# Patient Record
Sex: FEMALE | Race: OTHER | HISPANIC OR LATINO
[De-identification: names, ages, dates, MRNs, and addresses within clinical notes are randomized per-mention and may not be internally consistent; named-entity substitution may affect disease eponyms.]

---

## 2023-03-07 ENCOUNTER — APPOINTMENT (OUTPATIENT)
Dept: ORTHOPEDIC SURGERY | Facility: CLINIC | Age: 88
End: 2023-03-07
Payer: MEDICARE

## 2023-03-07 VITALS — BODY MASS INDEX: 21.6 KG/M2 | WEIGHT: 110 LBS | HEIGHT: 60 IN

## 2023-03-07 DIAGNOSIS — I10 ESSENTIAL (PRIMARY) HYPERTENSION: ICD-10-CM

## 2023-03-07 PROBLEM — Z00.00 ENCOUNTER FOR PREVENTIVE HEALTH EXAMINATION: Status: ACTIVE | Noted: 2023-03-07

## 2023-03-07 PROCEDURE — 99205 OFFICE O/P NEW HI 60 MIN: CPT

## 2023-03-07 RX ORDER — OXYCODONE 5 MG/1
5 TABLET ORAL EVERY 8 HOURS
Qty: 10 | Refills: 0 | Status: ACTIVE | COMMUNITY
Start: 2023-03-07 | End: 1900-01-01

## 2023-03-08 NOTE — IMAGING
[de-identified] : Constitutional: well developed and well nourished, able to communicate\par Cardiovascular: Peripheral vascular exam is grossly normal\par Neurologic: Alert and oriented, no acute distress.\par Skin: normal skin with no ulcers, rashes, or lesions\par Pulmonary: No respiratory distress, breathing comfortably on room air\par Lymphatics: No obvious lymphadenopathy or lymphedema in areas examined\par \par LOWER EXTREMITY/ RIGHT HIP EXAM\par Standing pelvic alignment: Symmetric with no Trendelenburg\par Atrophy: none\par Ecchymosis/swelling: none\par \par Range of Motion\par Hip: Flexion/extension/ER/IR    deferred due to pain\par \par Neurovascular\par Distal extremities: warm to touch\par Sensation to light touch: intact\par Muscle strength: 5/5\par \par \par XR taken at [facility] on 03/02/23: R femoral neck fracture

## 2023-03-08 NOTE — HISTORY OF PRESENT ILLNESS
[Sudden] : sudden [10] : 10 [5] : 5 [Dull/Aching] : dull/aching [Constant] : constant [Retired] : Work status: retired [de-identified] : 03/07/2023 Ms. SOLA RINCON is a 89 years old  F who  presents today for evaluation of right hip. States was getting up from a chair and fell and landed on her knee and hip two weeks prior. Did not go to the ER. Had an XR showing a femoral neck fx. Ambulates with a cane prior to fall. \par PMH: COPD [] : no

## 2023-03-08 NOTE — ASSESSMENT
[FreeTextEntry1] : 89 y/o F with femoral neck fracture, 2 week prior\par \par -Recommended go to the ER for R hemiarthroplasty risks and benefits explained\par -Patient Non weightbearing until after surgery\par

## 2023-03-09 ENCOUNTER — INPATIENT (INPATIENT)
Facility: HOSPITAL | Age: 88
LOS: 4 days | Discharge: NURSING, REHAB OR LONG-TERM CARE | End: 2023-03-14
Attending: ORTHOPAEDIC SURGERY | Admitting: ORTHOPAEDIC SURGERY
Payer: MEDICARE

## 2023-03-09 VITALS
RESPIRATION RATE: 18 BRPM | TEMPERATURE: 98 F | WEIGHT: 110.01 LBS | DIASTOLIC BLOOD PRESSURE: 76 MMHG | OXYGEN SATURATION: 97 % | HEART RATE: 96 BPM | SYSTOLIC BLOOD PRESSURE: 153 MMHG | HEIGHT: 59 IN

## 2023-03-09 LAB
ABO RH CONFIRMATION: SIGNIFICANT CHANGE UP
ANION GAP SERPL CALC-SCNC: 11 MMOL/L — SIGNIFICANT CHANGE UP (ref 5–17)
ANION GAP SERPL CALC-SCNC: 8 MMOL/L — SIGNIFICANT CHANGE UP (ref 5–17)
APTT BLD: 31.8 SEC — SIGNIFICANT CHANGE UP (ref 27.5–35.5)
BLD GP AB SCN SERPL QL: SIGNIFICANT CHANGE UP
BUN SERPL-MCNC: 19 MG/DL — SIGNIFICANT CHANGE UP (ref 7–23)
BUN SERPL-MCNC: 26 MG/DL — HIGH (ref 7–23)
CALCIUM SERPL-MCNC: 8.7 MG/DL — SIGNIFICANT CHANGE UP (ref 8.5–10.1)
CALCIUM SERPL-MCNC: 9.5 MG/DL — SIGNIFICANT CHANGE UP (ref 8.5–10.1)
CHLORIDE SERPL-SCNC: 95 MMOL/L — LOW (ref 96–108)
CHLORIDE SERPL-SCNC: 97 MMOL/L — SIGNIFICANT CHANGE UP (ref 96–108)
CO2 SERPL-SCNC: 24 MMOL/L — SIGNIFICANT CHANGE UP (ref 22–31)
CO2 SERPL-SCNC: 27 MMOL/L — SIGNIFICANT CHANGE UP (ref 22–31)
CREAT SERPL-MCNC: 0.7 MG/DL — SIGNIFICANT CHANGE UP (ref 0.5–1.3)
CREAT SERPL-MCNC: 0.84 MG/DL — SIGNIFICANT CHANGE UP (ref 0.5–1.3)
EGFR: 64 ML/MIN/1.73M2 — SIGNIFICANT CHANGE UP
EGFR: 80 ML/MIN/1.73M2 — SIGNIFICANT CHANGE UP
FLUAV AG NPH QL: SIGNIFICANT CHANGE UP
FLUBV AG NPH QL: SIGNIFICANT CHANGE UP
GLUCOSE SERPL-MCNC: 108 MG/DL — HIGH (ref 70–99)
GLUCOSE SERPL-MCNC: 115 MG/DL — HIGH (ref 70–99)
HCT VFR BLD CALC: 41.8 % — SIGNIFICANT CHANGE UP (ref 34.5–45)
HGB BLD-MCNC: 14 G/DL — SIGNIFICANT CHANGE UP (ref 11.5–15.5)
INR BLD: 1.01 RATIO — SIGNIFICANT CHANGE UP (ref 0.88–1.16)
MCHC RBC-ENTMCNC: 30.5 PG — SIGNIFICANT CHANGE UP (ref 27–34)
MCHC RBC-ENTMCNC: 33.5 G/DL — SIGNIFICANT CHANGE UP (ref 32–36)
MCV RBC AUTO: 91.1 FL — SIGNIFICANT CHANGE UP (ref 80–100)
NRBC # BLD: 0 /100 WBCS — SIGNIFICANT CHANGE UP (ref 0–0)
PLATELET # BLD AUTO: 487 K/UL — HIGH (ref 150–400)
POTASSIUM SERPL-MCNC: 3.9 MMOL/L — SIGNIFICANT CHANGE UP (ref 3.5–5.3)
POTASSIUM SERPL-MCNC: 4.2 MMOL/L — SIGNIFICANT CHANGE UP (ref 3.5–5.3)
POTASSIUM SERPL-SCNC: 3.9 MMOL/L — SIGNIFICANT CHANGE UP (ref 3.5–5.3)
POTASSIUM SERPL-SCNC: 4.2 MMOL/L — SIGNIFICANT CHANGE UP (ref 3.5–5.3)
PROTHROM AB SERPL-ACNC: 12.1 SEC — SIGNIFICANT CHANGE UP (ref 10.5–13.4)
RBC # BLD: 4.59 M/UL — SIGNIFICANT CHANGE UP (ref 3.8–5.2)
RBC # FLD: 14.3 % — SIGNIFICANT CHANGE UP (ref 10.3–14.5)
SARS-COV-2 RNA SPEC QL NAA+PROBE: SIGNIFICANT CHANGE UP
SODIUM SERPL-SCNC: 130 MMOL/L — LOW (ref 135–145)
SODIUM SERPL-SCNC: 132 MMOL/L — LOW (ref 135–145)
WBC # BLD: 11.8 K/UL — HIGH (ref 3.8–10.5)
WBC # FLD AUTO: 11.8 K/UL — HIGH (ref 3.8–10.5)

## 2023-03-09 PROCEDURE — 93010 ELECTROCARDIOGRAM REPORT: CPT

## 2023-03-09 PROCEDURE — 71045 X-RAY EXAM CHEST 1 VIEW: CPT | Mod: 26

## 2023-03-09 PROCEDURE — 73552 X-RAY EXAM OF FEMUR 2/>: CPT | Mod: 26,RT

## 2023-03-09 PROCEDURE — 99285 EMERGENCY DEPT VISIT HI MDM: CPT

## 2023-03-09 PROCEDURE — 93970 EXTREMITY STUDY: CPT | Mod: 26

## 2023-03-09 PROCEDURE — 99223 1ST HOSP IP/OBS HIGH 75: CPT

## 2023-03-09 PROCEDURE — 73502 X-RAY EXAM HIP UNI 2-3 VIEWS: CPT | Mod: 26,RT

## 2023-03-09 RX ORDER — SODIUM CHLORIDE 9 MG/ML
1 INJECTION INTRAMUSCULAR; INTRAVENOUS; SUBCUTANEOUS ONCE
Refills: 0 | Status: COMPLETED | OUTPATIENT
Start: 2023-03-09 | End: 2023-03-10

## 2023-03-09 RX ORDER — SODIUM CHLORIDE 9 MG/ML
1000 INJECTION INTRAMUSCULAR; INTRAVENOUS; SUBCUTANEOUS
Refills: 0 | Status: DISCONTINUED | OUTPATIENT
Start: 2023-03-09 | End: 2023-03-10

## 2023-03-09 RX ORDER — METOPROLOL TARTRATE 50 MG
25 TABLET ORAL
Refills: 0 | Status: DISCONTINUED | OUTPATIENT
Start: 2023-03-09 | End: 2023-03-10

## 2023-03-09 RX ORDER — BUDESONIDE AND FORMOTEROL FUMARATE DIHYDRATE 160; 4.5 UG/1; UG/1
2 AEROSOL RESPIRATORY (INHALATION)
Qty: 0 | Refills: 0 | DISCHARGE

## 2023-03-09 RX ORDER — ACETAMINOPHEN 500 MG
650 TABLET ORAL EVERY 6 HOURS
Refills: 0 | Status: DISCONTINUED | OUTPATIENT
Start: 2023-03-09 | End: 2023-03-10

## 2023-03-09 RX ORDER — SODIUM CHLORIDE 9 MG/ML
500 INJECTION INTRAMUSCULAR; INTRAVENOUS; SUBCUTANEOUS ONCE
Refills: 0 | Status: COMPLETED | OUTPATIENT
Start: 2023-03-09 | End: 2023-03-09

## 2023-03-09 RX ORDER — BUDESONIDE AND FORMOTEROL FUMARATE DIHYDRATE 160; 4.5 UG/1; UG/1
2 AEROSOL RESPIRATORY (INHALATION)
Refills: 0 | Status: DISCONTINUED | OUTPATIENT
Start: 2023-03-09 | End: 2023-03-10

## 2023-03-09 RX ORDER — POLYETHYLENE GLYCOL 3350 17 G/17G
17 POWDER, FOR SOLUTION ORAL AT BEDTIME
Refills: 0 | Status: DISCONTINUED | OUTPATIENT
Start: 2023-03-09 | End: 2023-03-10

## 2023-03-09 RX ORDER — ONDANSETRON 8 MG/1
4 TABLET, FILM COATED ORAL EVERY 6 HOURS
Refills: 0 | Status: DISCONTINUED | OUTPATIENT
Start: 2023-03-09 | End: 2023-03-10

## 2023-03-09 RX ORDER — SODIUM CHLORIDE 9 MG/ML
1000 INJECTION, SOLUTION INTRAVENOUS
Refills: 0 | Status: DISCONTINUED | OUTPATIENT
Start: 2023-03-09 | End: 2023-03-09

## 2023-03-09 RX ORDER — CEFAZOLIN SODIUM 1 G
2000 VIAL (EA) INJECTION EVERY 8 HOURS
Refills: 0 | Status: COMPLETED | OUTPATIENT
Start: 2023-03-10 | End: 2023-03-10

## 2023-03-09 RX ORDER — METOPROLOL TARTRATE 50 MG
1 TABLET ORAL
Qty: 0 | Refills: 0 | DISCHARGE

## 2023-03-09 RX ORDER — MAGNESIUM HYDROXIDE 400 MG/1
30 TABLET, CHEWABLE ORAL DAILY
Refills: 0 | Status: DISCONTINUED | OUTPATIENT
Start: 2023-03-09 | End: 2023-03-10

## 2023-03-09 RX ORDER — SODIUM CHLORIDE 9 MG/ML
1 INJECTION INTRAMUSCULAR; INTRAVENOUS; SUBCUTANEOUS ONCE
Refills: 0 | Status: COMPLETED | OUTPATIENT
Start: 2023-03-09 | End: 2023-03-09

## 2023-03-09 RX ORDER — OXYCODONE HYDROCHLORIDE 5 MG/1
5 TABLET ORAL
Refills: 0 | Status: DISCONTINUED | OUTPATIENT
Start: 2023-03-09 | End: 2023-03-10

## 2023-03-09 RX ORDER — SODIUM CHLORIDE 9 MG/ML
1000 INJECTION INTRAMUSCULAR; INTRAVENOUS; SUBCUTANEOUS
Refills: 0 | Status: DISCONTINUED | OUTPATIENT
Start: 2023-03-09 | End: 2023-03-09

## 2023-03-09 RX ORDER — AMLODIPINE BESYLATE 2.5 MG/1
1 TABLET ORAL
Qty: 0 | Refills: 0 | DISCHARGE

## 2023-03-09 RX ORDER — HEPARIN SODIUM 5000 [USP'U]/ML
5000 INJECTION INTRAVENOUS; SUBCUTANEOUS EVERY 12 HOURS
Refills: 0 | Status: DISCONTINUED | OUTPATIENT
Start: 2023-03-09 | End: 2023-03-09

## 2023-03-09 RX ORDER — ACETAMINOPHEN 500 MG
750 TABLET ORAL ONCE
Refills: 0 | Status: COMPLETED | OUTPATIENT
Start: 2023-03-09 | End: 2023-03-09

## 2023-03-09 RX ORDER — OXYCODONE HYDROCHLORIDE 5 MG/1
2.5 TABLET ORAL
Refills: 0 | Status: DISCONTINUED | OUTPATIENT
Start: 2023-03-09 | End: 2023-03-10

## 2023-03-09 RX ORDER — HYDROMORPHONE HYDROCHLORIDE 2 MG/ML
0.5 INJECTION INTRAMUSCULAR; INTRAVENOUS; SUBCUTANEOUS ONCE
Refills: 0 | Status: DISCONTINUED | OUTPATIENT
Start: 2023-03-09 | End: 2023-03-10

## 2023-03-09 RX ORDER — SENNA PLUS 8.6 MG/1
2 TABLET ORAL AT BEDTIME
Refills: 0 | Status: DISCONTINUED | OUTPATIENT
Start: 2023-03-09 | End: 2023-03-10

## 2023-03-09 RX ORDER — TRAMADOL HYDROCHLORIDE 50 MG/1
25 TABLET ORAL EVERY 6 HOURS
Refills: 0 | Status: DISCONTINUED | OUTPATIENT
Start: 2023-03-09 | End: 2023-03-10

## 2023-03-09 RX ORDER — AMLODIPINE BESYLATE 2.5 MG/1
5 TABLET ORAL DAILY
Refills: 0 | Status: DISCONTINUED | OUTPATIENT
Start: 2023-03-09 | End: 2023-03-10

## 2023-03-09 RX ADMIN — Medication 650 MILLIGRAM(S): at 12:00

## 2023-03-09 RX ADMIN — OXYCODONE HYDROCHLORIDE 5 MILLIGRAM(S): 5 TABLET ORAL at 21:16

## 2023-03-09 RX ADMIN — OXYCODONE HYDROCHLORIDE 5 MILLIGRAM(S): 5 TABLET ORAL at 21:46

## 2023-03-09 RX ADMIN — Medication 300 MILLIGRAM(S): at 09:09

## 2023-03-09 RX ADMIN — Medication 1 TABLET(S): at 12:01

## 2023-03-09 RX ADMIN — Medication 650 MILLIGRAM(S): at 13:00

## 2023-03-09 RX ADMIN — Medication 750 MILLIGRAM(S): at 09:49

## 2023-03-09 RX ADMIN — SODIUM CHLORIDE 1 GRAM(S): 9 INJECTION INTRAMUSCULAR; INTRAVENOUS; SUBCUTANEOUS at 13:19

## 2023-03-09 RX ADMIN — AMLODIPINE BESYLATE 5 MILLIGRAM(S): 2.5 TABLET ORAL at 13:32

## 2023-03-09 RX ADMIN — SODIUM CHLORIDE 150 MILLILITER(S): 9 INJECTION INTRAMUSCULAR; INTRAVENOUS; SUBCUTANEOUS at 19:04

## 2023-03-09 RX ADMIN — Medication 25 MILLIGRAM(S): at 23:22

## 2023-03-09 RX ADMIN — Medication 650 MILLIGRAM(S): at 21:46

## 2023-03-09 RX ADMIN — Medication 650 MILLIGRAM(S): at 21:16

## 2023-03-09 RX ADMIN — HEPARIN SODIUM 5000 UNIT(S): 5000 INJECTION INTRAVENOUS; SUBCUTANEOUS at 09:10

## 2023-03-09 RX ADMIN — Medication 750 MILLIGRAM(S): at 09:19

## 2023-03-09 RX ADMIN — SODIUM CHLORIDE 75 MILLILITER(S): 9 INJECTION, SOLUTION INTRAVENOUS at 08:11

## 2023-03-09 RX ADMIN — ONDANSETRON 4 MILLIGRAM(S): 8 TABLET, FILM COATED ORAL at 13:19

## 2023-03-09 RX ADMIN — SODIUM CHLORIDE 500 MILLILITER(S): 9 INJECTION INTRAMUSCULAR; INTRAVENOUS; SUBCUTANEOUS at 13:18

## 2023-03-09 NOTE — ED PROVIDER NOTE - CLINICAL SUMMARY MEDICAL DECISION MAKING FREE TEXT BOX
sent to ed for ortho surgery sent to ed for ortho surgery. Pre op testing ordered by ortho. Hemodynamically stable. Pain well controlled. Admit.

## 2023-03-09 NOTE — CONSULT NOTE ADULT - ASSESSMENT
89 y/o female with pmh of HTN presents with right hip fx s/p mechanical fall in need of hemiarthroplasty.  History taken from patient and son and she denies any history of chf/CAD/angina/orthopnea/dyspnea on exertion. Can walk a distance of multiple blocks without stopping. vitals and PE benign. Denies any current cp, sob, cough or increased sputum. EKG unremarkable. Has mild hyponatremia but baseline labs are uknown. Will give a small bolus of normal saline and reeval BMP. Pt low to intermediate risk for planned procedure.     HTN   - c/w amlodipine and BB     hyponatremia   - trend     hip fracture  - care per ortho   - dvt ppx per surgery

## 2023-03-09 NOTE — PATIENT PROFILE ADULT - NSPROGENOTHERPROVIDER_GEN_A_NUR
none Complex Repair And Graft Additional Text (Will Appearing After The Standard Complex Repair Text): The complex repair was not sufficient to completely close the primary defect. The remaining additional defect was repaired with the graft mentioned below.

## 2023-03-09 NOTE — ED PROVIDER NOTE - MUSCULOSKELETAL MINIMAL EXAM
limited rom of R hip due to pain. dp/pt pulses intact. Able to range ankle and wiggle toes without issue

## 2023-03-09 NOTE — CONSULT NOTE ADULT - SUBJECTIVE AND OBJECTIVE BOX
HPI:      PAST MEDICAL & SURGICAL HISTORY:      REVIEW OF SYSTEMS:    CONSTITUTIONAL: No fever, weight loss, or fatigue  EYES: No eye pain, visual disturbances, or discharge  ENMT:  No difficulty hearing, tinnitus, vertigo; No sinus or throat pain  NECK: No pain or stiffness  RESPIRATORY: No cough, wheezing, chills or hemoptysis; No shortness of breath  CARDIOVASCULAR: No chest pain, palpitations, dizziness, or leg swelling  GASTROINTESTINAL: No abdominal or epigastric pain. No nausea, vomiting, or hematemesis; No diarrhea or constipation. No melena or hematochezia.  GENITOURINARY: No dysuria, frequency, hematuria, or incontinence  NEUROLOGICAL: No headaches, memory loss, loss of strength, numbness, or tremors  SKIN: No itching, burning, rashes, or lesions   LYMPH NODES: No enlarged glands  ENDOCRINE: No heat or cold intolerance; No hair loss        MEDICATIONS  (STANDING):  acetaminophen     Tablet .. 650 milliGRAM(s) Oral every 6 hours  amLODIPine   Tablet 5 milliGRAM(s) Oral daily  budesonide 160 MICROgram(s)/formoterol 4.5 MICROgram(s) Inhaler 2 Puff(s) Inhalation two times a day  heparin   Injectable 5000 Unit(s) SubCutaneous every 12 hours  HYDROmorphone  Injectable 0.5 milliGRAM(s) IV Push once  metoprolol tartrate 25 milliGRAM(s) Oral two times a day  multivitamin 1 Tablet(s) Oral daily  polyethylene glycol 3350 17 Gram(s) Oral at bedtime  senna 2 Tablet(s) Oral at bedtime    MEDICATIONS  (PRN):  magnesium hydroxide Suspension 30 milliLiter(s) Oral daily PRN Constipation  ondansetron Injectable 4 milliGRAM(s) IV Push every 6 hours PRN Nausea and/or Vomiting  oxyCODONE    IR 2.5 milliGRAM(s) Oral every 3 hours PRN Moderate Pain (4 - 6)  oxyCODONE    IR 5 milliGRAM(s) Oral every 3 hours PRN Severe Pain (7 - 10)  traMADol 25 milliGRAM(s) Oral every 6 hours PRN Mild Pain (1 - 3)      Allergies    No Known Allergies    Intolerances        SOCIAL HISTORY:    FAMILY HISTORY:      Vital Signs Last 24 Hrs  T(C): 36.8 (09 Mar 2023 11:27), Max: 36.8 (09 Mar 2023 11:27)  T(F): 98.3 (09 Mar 2023 11:27), Max: 98.3 (09 Mar 2023 11:27)  HR: 89 (09 Mar 2023 11:27) (89 - 96)  BP: 131/70 (09 Mar 2023 11:27) (131/70 - 153/76)  BP(mean): --  RR: 17 (09 Mar 2023 11:27) (17 - 18)  SpO2: 98% (09 Mar 2023 11:27) (97% - 98%)    Parameters below as of 09 Mar 2023 11:27  Patient On (Oxygen Delivery Method): room air        PHYSICAL EXAM:    GENERAL: NAD, well-groomed, well-developed  HEAD:  Atraumatic, Normocephalic  EYES: EOMI, PERRLA, conjunctiva and sclera clear  ENMT: No tonsillar erythema, exudates, or enlargement; Moist mucous membranes, Good dentition, No lesions  NECK: Supple, No JVD, Normal thyroid  NERVOUS SYSTEM:  Alert & Oriented X3, Good concentration; Motor Strength 5/5 B/L upper and lower extremities; DTRs 2+ intact and symmetric  CHEST/LUNG: Clear to percussion bilaterally; No rales, rhonchi, wheezing, or rubs  HEART: Regular rate and rhythm; No murmurs, rubs, or gallops  ABDOMEN: Soft, Nontender, Nondistended; Bowel sounds present  EXTREMITIES:  2+ Peripheral Pulses, No clubbing, cyanosis, or edema  LYMPH: No lymphadenopathy noted  SKIN: No rashes or lesions      LABS:                        14.0   11.80 )-----------( 487      ( 09 Mar 2023 07:54 )             41.8     03-09    130<L>  |  95<L>  |  26<H>  ----------------------------<  115<H>  4.2   |  27  |  0.84    Ca    9.5      09 Mar 2023 07:54      PT/INR - ( 09 Mar 2023 07:54 )   PT: 12.1 sec;   INR: 1.01 ratio         PTT - ( 09 Mar 2023 07:54 )  PTT:31.8 sec      RADIOLOGY & ADDITIONAL STUDIES:

## 2023-03-09 NOTE — ED ADULT NURSE NOTE - NSIMPLEMENTINTERV_GEN_ALL_ED
Implemented All Fall with Harm Risk Interventions:  [place] to call system. Call bell, personal items and telephone within reach. Instruct patient to call for assistance. Room bathroom lighting operational. Non-slip footwear when patient is off stretcher. Physically safe environment: no spills, clutter or unnecessary equipment. Stretcher in lowest position, wheels locked, appropriate side rails in place. Provide visual cue, wrist band, yellow gown, etc. Monitor gait and stability. Monitor for mental status changes and reorient to person, place, and time. Review medications for side effects contributing to fall risk. Reinforce activity limits and safety measures with patient and family. Provide visual clues: red socks.

## 2023-03-09 NOTE — ED ADULT NURSE REASSESSMENT NOTE - NS ED NURSE REASSESS COMMENT FT1
Covering RN:    Patient is resting comfortably in bed, in no acute distress.  Family at bedside.  Meds admin, see MAR.  No signs of acute distress at this time.  Will continue to monitor.

## 2023-03-09 NOTE — PATIENT PROFILE ADULT - FUNCTIONAL ASSESSMENT - BASIC MOBILITY 6.
3-calculated by average/Not able to assess (calculate score using [facility] averaging method)

## 2023-03-09 NOTE — ED PROVIDER NOTE - OBJECTIVE STATEMENT
89 yo F who presents to ED for surgical repair of known pelvic fx seen in xrays from outpt ortho appointment. Pt had fall at that time. No other injuries. Pre op labs and imaging ordered by ortho. Will admit. Pt took 1/2 dose of her codeine at home, requesting tylenol.

## 2023-03-09 NOTE — ED ADULT NURSE NOTE - OBJECTIVE STATEMENT
Patient sent by ortho for pre op workup for R hip fx s/p trip and fall last week.   R leg shortened, slightly externally rotated.

## 2023-03-09 NOTE — PATIENT PROFILE ADULT - FALL HARM RISK - HARM RISK INTERVENTIONS

## 2023-03-09 NOTE — H&P ADULT - ASSESSMENT
Pt is a 89yF PMH HTN, COPD (not on home O2) who presented to Dr. Olivo's clinic 3/7 after a mechanical fall 2/28 x2. Pt lives at home with her son, and reported falling getting out of the bed, -HS, -LOC, and falling a second time thereafter, onto her right side, -HS, -LOC. When the pt stood up after the second mechanical fall, she was in considerable right hip pain. Pt presented to Dr. Olivo's clinic 3/7 due to her hip pain, and was found to have a femoral neck fracture on xray. Pt was then sent in to the ER for further care. Pt ambulates with a cane, however has been nonambulatory since the mechanical fall. Pt denies fevers, chills, new onset numbness, weakness or tingling in the extremities.    T(C): 36.6 (03-09-23 @ 07:29), Max: 36.6 (03-09-23 @ 07:29)  T(F): 97.8 (03-09-23 @ 07:29), Max: 97.8 (03-09-23 @ 07:29)  HR: 96 (03-09-23 @ 07:29) (96 - 96)  BP: 153/76 (03-09-23 @ 07:29) (153/76 - 153/76)  RR: 18 (03-09-23 @ 07:29) (18 - 18)  SpO2: 97% (03-09-23 @ 07:29) (97% - 97%)  Skin: No erythema, edema or gross lesions noted.   Proximal hip TTP, otherwise NTTP  SILT L2-S1  Motor: +EHL/FHL/TA/GSc  Compartments soft and compressible  Calves NTTP b/l  +2 DP    Secondary Survey:   LLE/RUE/LUE: No TTP over bony prominences, SILT, palpable pulses, full/painless range of motion, compartments soft    Spine: No bony tenderness. No palpable stepoffs.                          14.0   11.80 )-----------( 487      ( 09 Mar 2023 07:54 )             41.8     03-09    130<L>  |  95<L>  |  26<H>  ----------------------------<  115<H>  4.2   |  27  |  0.84    Ca    9.5      09 Mar 2023 07:54    Imaging:  Radiographs of the R hip demonstrate a displaced femoral neck fracture    A/p:  Pt is a 89yF with a right femoral neck fracture going for a right hip hemiarthroplasty 3/10  NWB RLE/PT/OT  Pain regimen PRN  DVT ppx: hold heparin after midnight, SCDs only  Pre op abx ppx  Pre op labs  Medical clearance appreciated  NPO after midnight except medications  Dispo per PT  Plan discussed w patient, who is in agreement with the above  Plan discussed w attending, who is in agreement with the above

## 2023-03-09 NOTE — ED ADULT TRIAGE NOTE - CHIEF COMPLAINT QUOTE
fell on 28th of February c/o right hip bruising and inability to bear weight, sent by DR. David Olivo for right hip surgery tomorrow. HX HTN, COPD

## 2023-03-10 ENCOUNTER — TRANSCRIPTION ENCOUNTER (OUTPATIENT)
Age: 88
End: 2023-03-10

## 2023-03-10 LAB
ANION GAP SERPL CALC-SCNC: 9 MMOL/L — SIGNIFICANT CHANGE UP (ref 5–17)
ANION GAP SERPL CALC-SCNC: 9 MMOL/L — SIGNIFICANT CHANGE UP (ref 5–17)
APTT BLD: 28.2 SEC — SIGNIFICANT CHANGE UP (ref 27.5–35.5)
BUN SERPL-MCNC: 10 MG/DL — SIGNIFICANT CHANGE UP (ref 7–23)
BUN SERPL-MCNC: 12 MG/DL — SIGNIFICANT CHANGE UP (ref 7–23)
CALCIUM SERPL-MCNC: 8.5 MG/DL — SIGNIFICANT CHANGE UP (ref 8.5–10.1)
CALCIUM SERPL-MCNC: 9.4 MG/DL — SIGNIFICANT CHANGE UP (ref 8.5–10.1)
CHLORIDE SERPL-SCNC: 100 MMOL/L — SIGNIFICANT CHANGE UP (ref 96–108)
CHLORIDE SERPL-SCNC: 102 MMOL/L — SIGNIFICANT CHANGE UP (ref 96–108)
CO2 SERPL-SCNC: 23 MMOL/L — SIGNIFICANT CHANGE UP (ref 22–31)
CO2 SERPL-SCNC: 28 MMOL/L — SIGNIFICANT CHANGE UP (ref 22–31)
CREAT SERPL-MCNC: 0.72 MG/DL — SIGNIFICANT CHANGE UP (ref 0.5–1.3)
CREAT SERPL-MCNC: 0.8 MG/DL — SIGNIFICANT CHANGE UP (ref 0.5–1.3)
EGFR: 68 ML/MIN/1.73M2 — SIGNIFICANT CHANGE UP
EGFR: 77 ML/MIN/1.73M2 — SIGNIFICANT CHANGE UP
GLUCOSE SERPL-MCNC: 113 MG/DL — HIGH (ref 70–99)
GLUCOSE SERPL-MCNC: 97 MG/DL — SIGNIFICANT CHANGE UP (ref 70–99)
HCT VFR BLD CALC: 37.9 % — SIGNIFICANT CHANGE UP (ref 34.5–45)
HCT VFR BLD CALC: 40.6 % — SIGNIFICANT CHANGE UP (ref 34.5–45)
HGB BLD-MCNC: 12.4 G/DL — SIGNIFICANT CHANGE UP (ref 11.5–15.5)
HGB BLD-MCNC: 13.5 G/DL — SIGNIFICANT CHANGE UP (ref 11.5–15.5)
INR BLD: 1.02 RATIO — SIGNIFICANT CHANGE UP (ref 0.88–1.16)
MCHC RBC-ENTMCNC: 30.5 PG — SIGNIFICANT CHANGE UP (ref 27–34)
MCHC RBC-ENTMCNC: 30.6 PG — SIGNIFICANT CHANGE UP (ref 27–34)
MCHC RBC-ENTMCNC: 32.7 G/DL — SIGNIFICANT CHANGE UP (ref 32–36)
MCHC RBC-ENTMCNC: 33.3 G/DL — SIGNIFICANT CHANGE UP (ref 32–36)
MCV RBC AUTO: 92.1 FL — SIGNIFICANT CHANGE UP (ref 80–100)
MCV RBC AUTO: 93.1 FL — SIGNIFICANT CHANGE UP (ref 80–100)
NRBC # BLD: 0 /100 WBCS — SIGNIFICANT CHANGE UP (ref 0–0)
NRBC # BLD: 0 /100 WBCS — SIGNIFICANT CHANGE UP (ref 0–0)
PLATELET # BLD AUTO: 425 K/UL — HIGH (ref 150–400)
PLATELET # BLD AUTO: 453 K/UL — HIGH (ref 150–400)
POTASSIUM SERPL-MCNC: 3.4 MMOL/L — LOW (ref 3.5–5.3)
POTASSIUM SERPL-MCNC: 3.7 MMOL/L — SIGNIFICANT CHANGE UP (ref 3.5–5.3)
POTASSIUM SERPL-SCNC: 3.4 MMOL/L — LOW (ref 3.5–5.3)
POTASSIUM SERPL-SCNC: 3.7 MMOL/L — SIGNIFICANT CHANGE UP (ref 3.5–5.3)
PROTHROM AB SERPL-ACNC: 12.3 SEC — SIGNIFICANT CHANGE UP (ref 10.5–13.4)
RBC # BLD: 4.07 M/UL — SIGNIFICANT CHANGE UP (ref 3.8–5.2)
RBC # BLD: 4.41 M/UL — SIGNIFICANT CHANGE UP (ref 3.8–5.2)
RBC # FLD: 14.2 % — SIGNIFICANT CHANGE UP (ref 10.3–14.5)
RBC # FLD: 14.5 % — SIGNIFICANT CHANGE UP (ref 10.3–14.5)
SODIUM SERPL-SCNC: 134 MMOL/L — LOW (ref 135–145)
SODIUM SERPL-SCNC: 137 MMOL/L — SIGNIFICANT CHANGE UP (ref 135–145)
WBC # BLD: 17.12 K/UL — HIGH (ref 3.8–10.5)
WBC # BLD: 9.7 K/UL — SIGNIFICANT CHANGE UP (ref 3.8–10.5)
WBC # FLD AUTO: 17.12 K/UL — HIGH (ref 3.8–10.5)
WBC # FLD AUTO: 9.7 K/UL — SIGNIFICANT CHANGE UP (ref 3.8–10.5)

## 2023-03-10 PROCEDURE — 27236 TREAT THIGH FRACTURE: CPT | Mod: RT

## 2023-03-10 PROCEDURE — 99232 SBSQ HOSP IP/OBS MODERATE 35: CPT

## 2023-03-10 PROCEDURE — 27236 TREAT THIGH FRACTURE: CPT | Mod: AS,RT

## 2023-03-10 DEVICE — IMPLANTABLE DEVICE: Type: IMPLANTABLE DEVICE | Site: RIGHT HIP | Status: FUNCTIONAL

## 2023-03-10 DEVICE — CEMENT SIMPLEX P 40GM: Type: IMPLANTABLE DEVICE | Site: RIGHT HIP | Status: FUNCTIONAL

## 2023-03-10 RX ORDER — CEFAZOLIN SODIUM 1 G
2000 VIAL (EA) INJECTION EVERY 8 HOURS
Refills: 0 | Status: COMPLETED | OUTPATIENT
Start: 2023-03-10 | End: 2023-03-11

## 2023-03-10 RX ORDER — METOPROLOL TARTRATE 50 MG
25 TABLET ORAL
Refills: 0 | Status: DISCONTINUED | OUTPATIENT
Start: 2023-03-10 | End: 2023-03-14

## 2023-03-10 RX ORDER — BUDESONIDE AND FORMOTEROL FUMARATE DIHYDRATE 160; 4.5 UG/1; UG/1
2 AEROSOL RESPIRATORY (INHALATION)
Refills: 0 | Status: DISCONTINUED | OUTPATIENT
Start: 2023-03-10 | End: 2023-03-14

## 2023-03-10 RX ORDER — HYDRALAZINE HCL 50 MG
10 TABLET ORAL EVERY 8 HOURS
Refills: 0 | Status: DISCONTINUED | OUTPATIENT
Start: 2023-03-10 | End: 2023-03-10

## 2023-03-10 RX ORDER — FENTANYL CITRATE 50 UG/ML
50 INJECTION INTRAVENOUS
Refills: 0 | Status: DISCONTINUED | OUTPATIENT
Start: 2023-03-10 | End: 2023-03-10

## 2023-03-10 RX ORDER — OXYCODONE HYDROCHLORIDE 5 MG/1
5 TABLET ORAL
Refills: 0 | Status: DISCONTINUED | OUTPATIENT
Start: 2023-03-10 | End: 2023-03-14

## 2023-03-10 RX ORDER — OXYCODONE HYDROCHLORIDE 5 MG/1
2.5 TABLET ORAL
Refills: 0 | Status: DISCONTINUED | OUTPATIENT
Start: 2023-03-10 | End: 2023-03-14

## 2023-03-10 RX ORDER — ACETAMINOPHEN 500 MG
975 TABLET ORAL EVERY 8 HOURS
Refills: 0 | Status: DISCONTINUED | OUTPATIENT
Start: 2023-03-10 | End: 2023-03-14

## 2023-03-10 RX ORDER — MAGNESIUM HYDROXIDE 400 MG/1
30 TABLET, CHEWABLE ORAL DAILY
Refills: 0 | Status: DISCONTINUED | OUTPATIENT
Start: 2023-03-10 | End: 2023-03-14

## 2023-03-10 RX ORDER — ONDANSETRON 8 MG/1
4 TABLET, FILM COATED ORAL EVERY 6 HOURS
Refills: 0 | Status: DISCONTINUED | OUTPATIENT
Start: 2023-03-10 | End: 2023-03-14

## 2023-03-10 RX ORDER — POTASSIUM CHLORIDE 20 MEQ
20 PACKET (EA) ORAL
Refills: 0 | Status: COMPLETED | OUTPATIENT
Start: 2023-03-10 | End: 2023-03-11

## 2023-03-10 RX ORDER — SODIUM CHLORIDE 9 MG/ML
1000 INJECTION, SOLUTION INTRAVENOUS
Refills: 0 | Status: DISCONTINUED | OUTPATIENT
Start: 2023-03-10 | End: 2023-03-10

## 2023-03-10 RX ORDER — ENOXAPARIN SODIUM 100 MG/ML
40 INJECTION SUBCUTANEOUS EVERY 24 HOURS
Refills: 0 | Status: DISCONTINUED | OUTPATIENT
Start: 2023-03-11 | End: 2023-03-14

## 2023-03-10 RX ORDER — HYDROMORPHONE HYDROCHLORIDE 2 MG/ML
0.5 INJECTION INTRAMUSCULAR; INTRAVENOUS; SUBCUTANEOUS
Refills: 0 | Status: DISCONTINUED | OUTPATIENT
Start: 2023-03-10 | End: 2023-03-10

## 2023-03-10 RX ORDER — POLYETHYLENE GLYCOL 3350 17 G/17G
17 POWDER, FOR SOLUTION ORAL AT BEDTIME
Refills: 0 | Status: DISCONTINUED | OUTPATIENT
Start: 2023-03-10 | End: 2023-03-14

## 2023-03-10 RX ORDER — ONDANSETRON 8 MG/1
4 TABLET, FILM COATED ORAL ONCE
Refills: 0 | Status: DISCONTINUED | OUTPATIENT
Start: 2023-03-10 | End: 2023-03-10

## 2023-03-10 RX ORDER — ONDANSETRON 8 MG/1
4 TABLET, FILM COATED ORAL
Refills: 0 | Status: DISCONTINUED | OUTPATIENT
Start: 2023-03-10 | End: 2023-03-14

## 2023-03-10 RX ORDER — ACETAMINOPHEN 500 MG
1000 TABLET ORAL ONCE
Refills: 0 | Status: DISCONTINUED | OUTPATIENT
Start: 2023-03-10 | End: 2023-03-14

## 2023-03-10 RX ORDER — AMLODIPINE BESYLATE 2.5 MG/1
5 TABLET ORAL DAILY
Refills: 0 | Status: DISCONTINUED | OUTPATIENT
Start: 2023-03-10 | End: 2023-03-11

## 2023-03-10 RX ORDER — LANOLIN ALCOHOL/MO/W.PET/CERES
3 CREAM (GRAM) TOPICAL AT BEDTIME
Refills: 0 | Status: DISCONTINUED | OUTPATIENT
Start: 2023-03-10 | End: 2023-03-14

## 2023-03-10 RX ORDER — SENNA PLUS 8.6 MG/1
2 TABLET ORAL AT BEDTIME
Refills: 0 | Status: DISCONTINUED | OUTPATIENT
Start: 2023-03-10 | End: 2023-03-14

## 2023-03-10 RX ORDER — SODIUM CHLORIDE 9 MG/ML
1000 INJECTION INTRAMUSCULAR; INTRAVENOUS; SUBCUTANEOUS
Refills: 0 | Status: DISCONTINUED | OUTPATIENT
Start: 2023-03-10 | End: 2023-03-12

## 2023-03-10 RX ADMIN — AMLODIPINE BESYLATE 5 MILLIGRAM(S): 2.5 TABLET ORAL at 05:32

## 2023-03-10 RX ADMIN — Medication 25 MILLIGRAM(S): at 05:32

## 2023-03-10 RX ADMIN — Medication 650 MILLIGRAM(S): at 06:12

## 2023-03-10 RX ADMIN — Medication 650 MILLIGRAM(S): at 11:56

## 2023-03-10 RX ADMIN — Medication 100 MILLIGRAM(S): at 06:32

## 2023-03-10 RX ADMIN — SODIUM CHLORIDE 75 MILLILITER(S): 9 INJECTION INTRAMUSCULAR; INTRAVENOUS; SUBCUTANEOUS at 16:30

## 2023-03-10 RX ADMIN — Medication 100 MILLIGRAM(S): at 14:05

## 2023-03-10 RX ADMIN — SODIUM CHLORIDE 100 MILLILITER(S): 9 INJECTION, SOLUTION INTRAVENOUS at 22:13

## 2023-03-10 RX ADMIN — Medication 1 TABLET(S): at 11:41

## 2023-03-10 RX ADMIN — BUDESONIDE AND FORMOTEROL FUMARATE DIHYDRATE 2 PUFF(S): 160; 4.5 AEROSOL RESPIRATORY (INHALATION) at 06:13

## 2023-03-10 RX ADMIN — HYDROMORPHONE HYDROCHLORIDE 0.5 MILLIGRAM(S): 2 INJECTION INTRAMUSCULAR; INTRAVENOUS; SUBCUTANEOUS at 23:51

## 2023-03-10 RX ADMIN — OXYCODONE HYDROCHLORIDE 5 MILLIGRAM(S): 5 TABLET ORAL at 06:12

## 2023-03-10 RX ADMIN — OXYCODONE HYDROCHLORIDE 5 MILLIGRAM(S): 5 TABLET ORAL at 11:41

## 2023-03-10 RX ADMIN — Medication 650 MILLIGRAM(S): at 05:32

## 2023-03-10 RX ADMIN — HYDROMORPHONE HYDROCHLORIDE 0.5 MILLIGRAM(S): 2 INJECTION INTRAMUSCULAR; INTRAVENOUS; SUBCUTANEOUS at 22:51

## 2023-03-10 RX ADMIN — Medication 20 MILLIEQUIVALENT(S): at 22:49

## 2023-03-10 RX ADMIN — OXYCODONE HYDROCHLORIDE 5 MILLIGRAM(S): 5 TABLET ORAL at 05:33

## 2023-03-10 RX ADMIN — SODIUM CHLORIDE 1 GRAM(S): 9 INJECTION INTRAMUSCULAR; INTRAVENOUS; SUBCUTANEOUS at 05:37

## 2023-03-10 NOTE — DISCHARGE NOTE PROVIDER - CARE PROVIDER_API CALL
David Olivo)  [facility]  [facility]  [address] [place] [address], [address] [place] [address]  Phone: [phone]  Fax: [phone]  Follow Up Time:

## 2023-03-10 NOTE — PROGRESS NOTE ADULT - SUBJECTIVE AND OBJECTIVE BOX
Patient seen and examined at bedside. Pain is controlled. Pt feeling well. No nausea or vomiting.        Exam:  Gen: NAD, resting comfortably  RLE  Dressing c/d/i  +EHL/FHL/TA/GS  SILT dp/sp/saph/sural  +DP  Calf NTTP b/l  Compartments soft and compressible    A/P:  89yFemale Stable POD 0  s/p R yfn    -FU labs  -WBAT  -posterior hip precautions, abduction pillow when laying  -Pain control  -PT/OT  -Ppx ABX x24hrs  -DVT PE ppx- hold until POD1 then lovenox  -Incentive spirometry  -dispo per pt Patient seen and examined at bedside. Pain is controlled. Pt feeling well. No nausea or vomiting.        Exam:  Gen: NAD, resting comfortably  RLE  Dressing c/d/i  +EHL/FHL/TA/GS  SILT dp/sp/saph, subjective sural nerve diminished sensation  +DP  Calf NTTP b/l  Compartments soft and compressible    A/P:  89yFemale Stable POD 0  s/p R yfn    -FU labs  -WBAT  -posterior hip precautions, abduction pillow when laying  -Pain control  -PT/OT  -Ppx ABX x24hrs  -DVT PE ppx- hold until POD1 then lovenox  -Incentive spirometry  -dispo per pt

## 2023-03-10 NOTE — DISCHARGE NOTE PROVIDER - NSDCMRMEDTOKEN_GEN_ALL_CORE_FT
amLODIPine 5 mg oral tablet: 1 tab(s) orally once a day  Metoprolol Tartrate 25 mg oral tablet: 1 tab(s) orally 2 times a day  Symbicort 160 mcg-4.5 mcg/inh inhalation aerosol: 2 puff(s) inhaled 2 times a day, As Needed   amLODIPine 5 mg oral tablet: 1 tab(s) orally once a day  enoxaparin: 40 milligram(s) subcutaneous once a day  melatonin 3 mg oral tablet: 1 tab(s) orally once a day (at bedtime)  Metoprolol Tartrate 25 mg oral tablet: 1 tab(s) orally 2 times a day  ondansetron 4 mg oral tablet: 1 tab(s) orally 4 times a day  oxyCODONE 5 mg oral tablet: 1 tab(s) orally every 3 hours, As needed, Severe Pain (7 - 10)  Symbicort 160 mcg-4.5 mcg/inh inhalation aerosol: 2 puff(s) inhaled 2 times a day, As Needed

## 2023-03-10 NOTE — PROGRESS NOTE ADULT - ASSESSMENT
89 y/o female with pmh of HTN presents with right hip fx s/p mechanical fall in need of hemiarthroplasty.  History taken from patient and son and she denies any history of chf/CAD/angina/orthopnea/dyspnea on exertion. Can walk a distance of multiple blocks without stopping. vitals and PE benign. Denies any current cp, sob, cough or increased sputum. EKG unremarkable. Has mild hyponatremia but baseline labs are uknown. Will give a small bolus of normal saline and reeval BMP. Pt low to intermediate risk for planned procedure.     HTN. uncontrolled. Added home dose of hydralazine.  - c/w amlodipine and BB.  Monitor.     hyponatremia   -At risk for volume overload with current rate of IVF hydration. Discussed with ortho service and decreased the IVF rate. Sodium level improved already.     Right hip fracture. Traumatic from fall.  -cont current pain control.  DVT ppx and activity after hemiarthroplasty per ortho service.     Full code

## 2023-03-10 NOTE — PROGRESS NOTE ADULT - SUBJECTIVE AND OBJECTIVE BOX
CHIEF COMPLAINT: Follow up of HTN  left hip pain under control  no chest pain   no sob reported  no fever  no n/v/d/abd pain or leg edema      PHYSICAL EXAM:    GENERAL: Moderately built, no acute distress   CHEST/LUNG:  No wheezing, no crackles   HEART: Regular rate and rhythm; No murmurs  ABDOMEN: Soft, Nontender, Nondistended; Bowel sounds present  EXTREMITIES:  No clubbing, cyanosis, or edema. S/p right hip fracture   Psychiatry: AAO x 3, mood is appropriate       OBJECTIVE DATA:   Vital Signs Last 24 Hrs  T(C): 36.2 (10 Mar 2023 11:38), Max: 37.1 (09 Mar 2023 17:59)  T(F): 97.1 (10 Mar 2023 11:38), Max: 98.7 (09 Mar 2023 17:59)  HR: 86 (10 Mar 2023 11:38) (82 - 94)  BP: 158/72 (10 Mar 2023 11:38) (145/62 - 168/71)  BP(mean): --  RR: 18 (10 Mar 2023 11:38) (17 - 18)  SpO2: 95% (10 Mar 2023 11:38) (92% - 96%)    Parameters below as of 10 Mar 2023 11:38  Patient On (Oxygen Delivery Method): room air    LABS:                        13.5   9.70  )-----------( 453      ( 10 Mar 2023 06:10 )             40.6             03-10    137  |  100  |  12  ----------------------------<  97  3.7   |  28  |  0.80    Ca    9.4      10 Mar 2023 06:10                PT/INR - ( 10 Mar 2023 06:10 )   PT: 12.3 sec;   INR: 1.02 ratio         PTT - ( 10 Mar 2023 06:10 )  PTT:28.2 sec          Interval Radiology studies: reviewed by me  < from: Xray Femur 2 Views, Right (03.09.23 @ 09:10) >  IMPRESSION: Subcapital fracture right hip.There is some loss of volume   of the right upper lobe with a fine interstitial infiltrate. Acuity of   this process is uncertain.    < end of copied text >      MEDICATIONS  (STANDING):  acetaminophen     Tablet .. 650 milliGRAM(s) Oral every 6 hours  amLODIPine   Tablet 5 milliGRAM(s) Oral daily  budesonide 160 MICROgram(s)/formoterol 4.5 MICROgram(s) Inhaler 2 Puff(s) Inhalation two times a day  hydrALAZINE 10 milliGRAM(s) Oral every 8 hours  HYDROmorphone  Injectable 0.5 milliGRAM(s) IV Push once  metoprolol tartrate 25 milliGRAM(s) Oral two times a day  multivitamin 1 Tablet(s) Oral daily  polyethylene glycol 3350 17 Gram(s) Oral at bedtime  senna 2 Tablet(s) Oral at bedtime  sodium chloride 0.9%. 1000 milliLiter(s) (75 mL/Hr) IV Continuous <Continuous>    MEDICATIONS  (PRN):  magnesium hydroxide Suspension 30 milliLiter(s) Oral daily PRN Constipation  ondansetron Injectable 4 milliGRAM(s) IV Push every 6 hours PRN Nausea and/or Vomiting  oxyCODONE    IR 2.5 milliGRAM(s) Oral every 3 hours PRN Moderate Pain (4 - 6)  oxyCODONE    IR 5 milliGRAM(s) Oral every 3 hours PRN Severe Pain (7 - 10)  traMADol 25 milliGRAM(s) Oral every 6 hours PRN Mild Pain (1 - 3)

## 2023-03-10 NOTE — PROGRESS NOTE ADULT - ASSESSMENT
DOS: 3/10/22    ATTENDING SURGEON: David Olivo MD    ASSISTANT: IKER Silva    ANESTHESIA: General    PREOPERATIVE DIAGNOSIS: right femoral neck Fracture    POST OPERATIVE DIAGNOSIS:  right femoral neck Fracture    OPERATION: right hemiarthroplasty    INSTRUMENTATION USED:   Avenir cemented stem size 4, SO  size 28 head, 45mm bipolar head  +0 inner liner    INDICATION FOR THE PROCEDURE: The patient presented with a femoral neck fracture. She was a prior community ambulator with assistive device without any prior hip pain.The patient was thus indicated for the above mentioned procedure.    All risks and benefits of the procedure were explained to the patient. The patient fully understood the procedure and freely consented.    PROCEDURE IN DETAIL:  The patient was taken to the operating room and after anesthetic induction, was placed onto the surgical table in a lateral decubitus position. The bony prominences were well padded and a pegboard was used position the body. The skin and operative site were prepped and draped in the usual sterile fashion. A proper timeout was performed prior to the incision.    An incision was made over the posterolateral aspect of the femur both superior and inferior to the greater trochanter. The incision was deepened down to the fascia and IT band. The IT band was split parallel to the fibers and extended proximally along the fibers of the gluteus osito. A charnley retractor was inserted to retract the osito.    Next the external rotators and piriformis were identified. The piriformis was preserved and the ER/conjoint tendon was released along with the capsule and tagged. The hip was dislocated and the femoral neck fracture was cleaned up.  The head was measured and the proper size was trialed.    Attention was now turned to the femur. The femur was elevated and proper lateralization was performed. The femur was broached up to the planned size. Next trial reduction of the hip was performed and it was found to be stable at all physiologic ranges of motion.     The canal was cleaned and a cement restrictor placed. The canal was filled with cement and pressurized. The femoral stem was impacted in in place and the cement was allowed to harden. The head was impacted in place and once again leg lengths and offset were confirmed. All the arrays and checkpoints were removed at this time.     The wound was copiously irrigated and the deep fascia was closed with a barbed suture as was the subcutaneus layer and skin. A sterile occlusive dressing was placed. The patient was taken to the recovery room in stable condition. There were no complications during the procedure.  The assistance of a skilled physician assistant was required for the completion of the surgery.  DOS: 3/10/22    ATTENDING SURGEON: David Olivo MD    ASSISTANT: IKER Silva    ANESTHESIA: General    PREOPERATIVE DIAGNOSIS: right femoral neck Fracture    POST OPERATIVE DIAGNOSIS:  right femoral neck Fracture    OPERATION: right hemiarthroplasty    INSTRUMENTATION USED:   Avenir cemented stem size 1, SO  size 22 head, 43mm bipolar head  +3 inner liner    INDICATION FOR THE PROCEDURE: The patient presented with a femoral neck fracture. She was a prior community ambulator with assistive device without any prior hip pain.The patient was thus indicated for the above mentioned procedure.    All risks and benefits of the procedure were explained to the patient. The patient fully understood the procedure and freely consented.    PROCEDURE IN DETAIL:  The patient was taken to the operating room and after anesthetic induction, was placed onto the surgical table in a lateral decubitus position. The bony prominences were well padded and a pegboard was used position the body. The skin and operative site were prepped and draped in the usual sterile fashion. A proper timeout was performed prior to the incision.    An incision was made over the posterolateral aspect of the femur both superior and inferior to the greater trochanter. The incision was deepened down to the fascia and IT band. The IT band was split parallel to the fibers and extended proximally along the fibers of the gluteus osito. A charnley retractor was inserted to retract the osito.    Next the external rotators and piriformis were identified. The piriformis was preserved and the ER/conjoint tendon was released along with the capsule and tagged. The hip was dislocated and the femoral neck fracture was cleaned up.  The head was measured and the proper size was trialed.    Attention was now turned to the femur. The femur was elevated and proper lateralization was performed. The femur was broached up to the planned size. Next trial reduction of the hip was performed and it was found to be stable at all physiologic ranges of motion.     The canal was cleaned and a cement restrictor placed. The canal was filled with cement and pressurized. The femoral stem was impacted in in place and the cement was allowed to harden. The head was impacted in place and once again leg lengths and offset were confirmed. All the arrays and checkpoints were removed at this time.     The wound was copiously irrigated and the deep fascia was closed with a barbed suture as was the subcutaneus layer and skin. A sterile occlusive dressing was placed. The patient was taken to the recovery room in stable condition. There were no complications during the procedure.  The assistance of a skilled physician assistant was required for the completion of the surgery.

## 2023-03-10 NOTE — PROGRESS NOTE ADULT - SUBJECTIVE AND OBJECTIVE BOX
Patient seen and examined at bedside. Patient reports pain well controlled on medications. No acute events overnight. Pt denies fevers, chills, new onset numbness, weakness or tingling in the extremities.    T(C): 36.4 (03-10-23 @ 05:00), Max: 37.1 (03-09-23 @ 17:59)  T(F): 97.5 (03-10-23 @ 05:00), Max: 98.7 (03-09-23 @ 17:59)  HR: 94 (03-10-23 @ 05:00) (82 - 96)  BP: 152/74 (03-10-23 @ 05:00) (126/68 - 168/71)  RR: 18 (03-10-23 @ 05:00) (17 - 18)  SpO2: 96% (03-10-23 @ 05:00) (92% - 98%)    RLE  Skin: No erythema, edema or gross lesions noted.   Proximal hip TTP, otherwise NTTP  SILT L2-S1  Motor: +EHL/FHL/TA/GSc  Compartments soft and compressible  Calves NTTP b/l  +2 DP                          14.0   11.80 )-----------( 487      ( 09 Mar 2023 07:54 )             41.8   03-09    132<L>  |  97  |  19  ----------------------------<  108<H>  3.9   |  24  |  0.70    Ca    8.7      09 Mar 2023 16:15    A/p:  Pt is a 89yF with a right femoral neck fracture going for a right hip hemiarthroplasty 3/10  NWB RLE/PT/OT  Pain regimen PRN  DVT ppx: hold heparin after midnight, SCDs only  Pre op abx ppx  Pre op labs  Medical clearance appreciated  NPO except medications  Dispo per PT  Plan discussed w patient, who is in agreement with the above  Plan discussed w attending, who is in agreement with the above

## 2023-03-10 NOTE — DISCHARGE NOTE PROVIDER - NSDCFUADDINST_GEN_ALL_CORE_FT
Discharge Instructions for Total Hip Arthroplasty:    1. ACTIVITY: WBAT. Rolling walker. Posterior Hip Dislocation Precautions. Abduction Pillow while in bed for 6 weeks. Daily PT.  2. CALL FOR: fever over 101, wound redness, drainage or open area, calf pain/calf swelling.  3. BANDAGE: Change dressing to a new bandage POD7. May change sooner if dressing saturated or falling off. DO NOT REMOVE BANDAGE TO CHECK WOUND ON INTAKE.  4. STAPLES: RN Remove Staples POD14   5. SHOWER: Okay to shower. Do not scrub dressing or submerge under water. No baths or hot tubs.   6. DVT PE Prophylaxis:See Med Rec.  7.  GI: Continue Protonix daily while on Anticoagulant. eRx has been sent to your pharmacy.  8.  FOLLOW UP: Dr. Olivo. Call to schedule.  9. MEDICATION: eRX sent to your pharmacy for  if you go home.   10.**Call office if medications not covered under your insurance, especially BLOOD CLOT PREVENTION/anticoagulant medication.

## 2023-03-10 NOTE — BRIEF OPERATIVE NOTE - NSICDXBRIEFPROCEDURE_GEN_ALL_CORE_FT
PROCEDURES:  BETSY (total hip arthroplasty) 10-Mar-2023 20:44:14 Anjelica Mejia   PROCEDURES:  Hemiarthroplasty of right hip 10-Mar-2023 20:57:33  Anjelica Stoll

## 2023-03-10 NOTE — DISCHARGE NOTE PROVIDER - HOSPITAL COURSE
The patient is a 89y Female status post fall with right femoral neck fracture sent to [facility] for hemiarthroplasty. After appropriate medical optimization the patient was taken to the operating room on 3/10. Prophylactic antibiotics were started before the procedure and continued for 24 hours. There were no complications during the procedure and patient tolerated the procedure well. The patient was transferred to the recovery room in stable condition and subsequently to the surgical floor. The patient was placed on lovenox for anticoagulation while in house. All home medications were continued. The patient received physical therapy daily and daily labs were followed. The dressing was kept clean, dry, intact. The rest of the hospital stay was unremarkable.       **INCOMPLETE***** The patient is a 89y Female status post fall with right femoral neck fracture sent to [facility] for hemiarthroplasty. After appropriate medical optimization the patient was taken to the operating room on 3/10. Prophylactic antibiotics were started before the procedure and continued for 24 hours. There were no complications during the procedure and patient tolerated the procedure well. The patient was transferred to the recovery room in stable condition and subsequently to the surgical floor. The patient was placed on lovenox for anticoagulation while in house. All home medications were continued. The patient received physical therapy daily and daily labs were followed. The dressing was kept clean, dry, intact. The rest of the hospital stay was unremarkable.

## 2023-03-10 NOTE — PROGRESS NOTE ADULT - ASSESSMENT
I saw and examined the patient today. No changes from above documented exam.    I discussed this dx with the family at length. In order to restore the patient's ability to ambulate and minimize the complications associated with immobility, a Right hip hemiarthroplasty was recommended. The risks of the procedure were discussed at length which included but not limited to infection, bleeding, and damage to neurovascular structures. Informed consent was obtained.

## 2023-03-11 LAB
ANION GAP SERPL CALC-SCNC: 8 MMOL/L — SIGNIFICANT CHANGE UP (ref 5–17)
BUN SERPL-MCNC: 11 MG/DL — SIGNIFICANT CHANGE UP (ref 7–23)
CALCIUM SERPL-MCNC: 9.1 MG/DL — SIGNIFICANT CHANGE UP (ref 8.5–10.1)
CHLORIDE SERPL-SCNC: 99 MMOL/L — SIGNIFICANT CHANGE UP (ref 96–108)
CO2 SERPL-SCNC: 23 MMOL/L — SIGNIFICANT CHANGE UP (ref 22–31)
CREAT SERPL-MCNC: 0.67 MG/DL — SIGNIFICANT CHANGE UP (ref 0.5–1.3)
EGFR: 80 ML/MIN/1.73M2 — SIGNIFICANT CHANGE UP
GLUCOSE SERPL-MCNC: 125 MG/DL — HIGH (ref 70–99)
HCT VFR BLD CALC: 40.7 % — SIGNIFICANT CHANGE UP (ref 34.5–45)
HGB BLD-MCNC: 13.3 G/DL — SIGNIFICANT CHANGE UP (ref 11.5–15.5)
MCHC RBC-ENTMCNC: 30.2 PG — SIGNIFICANT CHANGE UP (ref 27–34)
MCHC RBC-ENTMCNC: 32.7 G/DL — SIGNIFICANT CHANGE UP (ref 32–36)
MCV RBC AUTO: 92.3 FL — SIGNIFICANT CHANGE UP (ref 80–100)
NRBC # BLD: 0 /100 WBCS — SIGNIFICANT CHANGE UP (ref 0–0)
PLATELET # BLD AUTO: 480 K/UL — HIGH (ref 150–400)
POTASSIUM SERPL-MCNC: 4.6 MMOL/L — SIGNIFICANT CHANGE UP (ref 3.5–5.3)
POTASSIUM SERPL-SCNC: 4.6 MMOL/L — SIGNIFICANT CHANGE UP (ref 3.5–5.3)
RBC # BLD: 4.41 M/UL — SIGNIFICANT CHANGE UP (ref 3.8–5.2)
RBC # FLD: 14.1 % — SIGNIFICANT CHANGE UP (ref 10.3–14.5)
SODIUM SERPL-SCNC: 130 MMOL/L — LOW (ref 135–145)
WBC # BLD: 12.2 K/UL — HIGH (ref 3.8–10.5)
WBC # FLD AUTO: 12.2 K/UL — HIGH (ref 3.8–10.5)

## 2023-03-11 PROCEDURE — 99232 SBSQ HOSP IP/OBS MODERATE 35: CPT

## 2023-03-11 PROCEDURE — 72170 X-RAY EXAM OF PELVIS: CPT | Mod: 26

## 2023-03-11 RX ORDER — AMLODIPINE BESYLATE 2.5 MG/1
10 TABLET ORAL DAILY
Refills: 0 | Status: DISCONTINUED | OUTPATIENT
Start: 2023-03-11 | End: 2023-03-14

## 2023-03-11 RX ADMIN — Medication 20 MILLIEQUIVALENT(S): at 00:11

## 2023-03-11 RX ADMIN — Medication 20 MILLIEQUIVALENT(S): at 01:25

## 2023-03-11 RX ADMIN — AMLODIPINE BESYLATE 5 MILLIGRAM(S): 2.5 TABLET ORAL at 05:43

## 2023-03-11 RX ADMIN — ONDANSETRON 4 MILLIGRAM(S): 8 TABLET, FILM COATED ORAL at 01:24

## 2023-03-11 RX ADMIN — SENNA PLUS 2 TABLET(S): 8.6 TABLET ORAL at 21:52

## 2023-03-11 RX ADMIN — Medication 100 MILLIGRAM(S): at 10:12

## 2023-03-11 RX ADMIN — POLYETHYLENE GLYCOL 3350 17 GRAM(S): 17 POWDER, FOR SOLUTION ORAL at 21:52

## 2023-03-11 RX ADMIN — Medication 975 MILLIGRAM(S): at 13:31

## 2023-03-11 RX ADMIN — Medication 975 MILLIGRAM(S): at 21:53

## 2023-03-11 RX ADMIN — SODIUM CHLORIDE 100 MILLILITER(S): 9 INJECTION INTRAMUSCULAR; INTRAVENOUS; SUBCUTANEOUS at 00:11

## 2023-03-11 RX ADMIN — Medication 25 MILLIGRAM(S): at 17:17

## 2023-03-11 RX ADMIN — Medication 975 MILLIGRAM(S): at 05:42

## 2023-03-11 RX ADMIN — ENOXAPARIN SODIUM 40 MILLIGRAM(S): 100 INJECTION SUBCUTANEOUS at 05:42

## 2023-03-11 RX ADMIN — ONDANSETRON 4 MILLIGRAM(S): 8 TABLET, FILM COATED ORAL at 05:43

## 2023-03-11 RX ADMIN — BUDESONIDE AND FORMOTEROL FUMARATE DIHYDRATE 2 PUFF(S): 160; 4.5 AEROSOL RESPIRATORY (INHALATION) at 17:18

## 2023-03-11 RX ADMIN — Medication 975 MILLIGRAM(S): at 21:52

## 2023-03-11 RX ADMIN — ONDANSETRON 4 MILLIGRAM(S): 8 TABLET, FILM COATED ORAL at 17:17

## 2023-03-11 RX ADMIN — Medication 975 MILLIGRAM(S): at 14:30

## 2023-03-11 RX ADMIN — ONDANSETRON 4 MILLIGRAM(S): 8 TABLET, FILM COATED ORAL at 11:46

## 2023-03-11 RX ADMIN — Medication 3 MILLIGRAM(S): at 21:52

## 2023-03-11 RX ADMIN — Medication 25 MILLIGRAM(S): at 05:42

## 2023-03-11 RX ADMIN — BUDESONIDE AND FORMOTEROL FUMARATE DIHYDRATE 2 PUFF(S): 160; 4.5 AEROSOL RESPIRATORY (INHALATION) at 05:42

## 2023-03-11 RX ADMIN — Medication 100 MILLIGRAM(S): at 02:17

## 2023-03-11 NOTE — OCCUPATIONAL THERAPY INITIAL EVALUATION ADULT - GENERAL OBSERVATIONS, REHAB EVAL
Pt encountered semisupine in bed, NAD, AXOX4, +heplock, +dressing R hip c/d/i, +abduction pillow intact, pt son present, PT darwin present, no c/o pain s/p right hemiarthroplasty.

## 2023-03-11 NOTE — OCCUPATIONAL THERAPY INITIAL EVALUATION ADULT - STRENGTHENING, PT EVAL
Pt will increase right lower extremity strength to 4/5 to improve functional strength needed to engage in functional tasks by 4 weeks

## 2023-03-11 NOTE — PROGRESS NOTE ADULT - SUBJECTIVE AND OBJECTIVE BOX
CHIEF COMPLAINT: Follow up of HTN  left hip pain under control POD#1  no chest pain   no sob reported  no fever  no n/v/d/abd pain or leg edema      PHYSICAL EXAM:    GENERAL: Moderately built, no acute distress   CHEST/LUNG:  No wheezing, no crackles   HEART: Regular rate and rhythm; No murmurs  ABDOMEN: Soft, Nontender, Nondistended; Bowel sounds present  EXTREMITIES:  No clubbing, cyanosis, or edema. S/p right hip hemiarthroplasty  Psychiatry: AAO x 2-3, mood is appropriate       OBJECTIVE DATA:     Vital Signs Last 24 Hrs  T(C): 36.4 (11 Mar 2023 09:15), Max: 36.4 (10 Mar 2023 10:36)  T(F): 97.5 (11 Mar 2023 09:15), Max: 97.5 (10 Mar 2023 10:36)  HR: 83 (11 Mar 2023 09:15) (68 - 110)  BP: 131/51 (11 Mar 2023 09:15) (122/56 - 182/85)  BP(mean): --  RR: 18 (11 Mar 2023 09:15) (12 - 18)  SpO2: 97% (11 Mar 2023 09:15) (92% - 99%)    Parameters below as of 11 Mar 2023 09:15  Patient On (Oxygen Delivery Method): room air               Daily     Daily   LABS:                        13.3   12.20 )-----------( 480      ( 11 Mar 2023 05:53 )             40.7             03-11    130<L>  |  99  |  11  ----------------------------<  125<H>  4.6   |  23  |  0.67    Ca    9.1      11 Mar 2023 05:53                PT/INR - ( 10 Mar 2023 06:10 )   PT: 12.3 sec;   INR: 1.02 ratio         PTT - ( 10 Mar 2023 06:10 )  PTT:28.2 sec         MEDICATIONS  (STANDING):  acetaminophen     Tablet .. 975 milliGRAM(s) Oral every 8 hours  acetaminophen   IVPB .. 1000 milliGRAM(s) IV Intermittent once  amLODIPine   Tablet 10 milliGRAM(s) Oral daily  budesonide 160 MICROgram(s)/formoterol 4.5 MICROgram(s) Inhaler 2 Puff(s) Inhalation two times a day  enoxaparin Injectable 40 milliGRAM(s) SubCutaneous every 24 hours  melatonin 3 milliGRAM(s) Oral at bedtime  metoprolol tartrate 25 milliGRAM(s) Oral two times a day  ondansetron    Tablet 4 milliGRAM(s) Oral four times a day  polyethylene glycol 3350 17 Gram(s) Oral at bedtime  senna 2 Tablet(s) Oral at bedtime  sodium chloride 0.9%. 1000 milliLiter(s) (100 mL/Hr) IV Continuous <Continuous>    MEDICATIONS  (PRN):  magnesium hydroxide Suspension 30 milliLiter(s) Oral daily PRN Constipation  ondansetron Injectable 4 milliGRAM(s) IV Push every 6 hours PRN Nausea and/or Vomiting  oxyCODONE    IR 2.5 milliGRAM(s) Oral every 3 hours PRN Moderate Pain (4 - 6)  oxyCODONE    IR 5 milliGRAM(s) Oral every 3 hours PRN Severe Pain (7 - 10)

## 2023-03-11 NOTE — PROGRESS NOTE ADULT - ASSESSMENT
89 y/o female with pmh of HTN presents with right hip fx s/p mechanical fall in need of hemiarthroplasty.  History taken from patient and son and she denies any history of chf/CAD/angina/orthopnea/dyspnea on exertion. Can walk a distance of multiple blocks without stopping. vitals and PE benign. Denies any current cp, sob, cough or increased sputum. EKG unremarkable. Has mild hyponatremia but baseline labs are uknown. Will give a small bolus of normal saline and reeval BMP. Pt low to intermediate risk for planned procedure.     HTN. controlled. cont home dose of hydralazine.  - c/w amlodipine and BB.  Monitor.     hyponatremia   -labile. discussed with nurse to dc IVF once patient able to tolerate oral diet.     Right hip fracture. Traumatic from fall.  -cont current pain control.  DVT ppx and activity after hemiarthroplasty per ortho service.     Full code   Medically no contraindications to discharge.

## 2023-03-11 NOTE — PHYSICAL THERAPY INITIAL EVALUATION ADULT - RANGE OF MOTION EXAMINATION, REHAB EVAL
R hip posterior dislocation: NO flexion beyond 90 degrees, no IR, no ADD pass midline/Left LE ROM was WFL (within functional limits)/deficits as listed below

## 2023-03-11 NOTE — PHYSICAL THERAPY INITIAL EVALUATION ADULT - ADDITIONAL COMMENTS
PH with 5-6 stairs from front and no steps from the back. inside there is a flight with almost 2 HR's to bedrooms. pt was independent with no AD and almost always her son, Chaz, assist her.

## 2023-03-11 NOTE — OCCUPATIONAL THERAPY INITIAL EVALUATION ADULT - RANGE OF MOTION EXAMINATION, LOWER EXTREMITY
Internal Medicine History and Physical    Admission Date: 1/10/2017                     Patient: Art Eckert             Date of Service:  17  : 1949                           67 year old male    Presenting Complaint:  Left foot ischemia     HPI:  The patient is a very pleasant 67 year old male. At baseline, he has a pmh significant for HTN, CAD, HLD, and CKD. He originally presented to [place] with left leg and foot pain and his left foot was found to be ischemic. Pt was placed on heparin drip and had stenting and balloon angioplasty of the left external iliac artery. He has been transferred to [facility] for the purpose of additional peripheral angiogram with Dr. Olmos for distal SFA occlusion. During my H&P, the patient interacts well and his pain is controlled.     Past Medical History:  Past Medical History   Diagnosis Date   • Bell palsy 2008     recurrent   • Blood clot associated with vein wall inflammation    • Bronchitis    • Coronary artery disease    • DJD (degenerative joint disease)    • Elevated prostate specific antigen (PSA) 10/28/2010     6.07   • Essential (primary) hypertension    • Heart disease      RCA stent   • High cholesterol    • HTN (hypertension)    • Malignant neoplasm of prostate 2011     pt2a No Mo gg3+4=7/10/SmNeg   • Polyp of colon    • Polyp of esophagus    • Urinary tract infection        Past Surgical History:  Past Surgical History   Procedure Laterality Date   • Biopsy of prostate,needle/punch  2010     GG 4+3=7   • Throat surgery procedure unlisted       Removal of polyps   • Tonsillectomy and adenoidectomy     • Cdl coronary stent  05     right coronary artery   • Lap prostatec retro rad/nerve  3/2/2011     pt2a N0 M0 gg3+4=7/10/ neg   • Paravertebral facet inj jnt lumbar sacral 1  12     2ND RIGHT LMBB 1 WEEK   • Paravertebral facet inj jnt lumbar sacral 1  10/15/12     2nd left LMBB 1 week    • Paravertebral facet inj jnt  lumbar sacral 1  10/30/12     LEFT LRFA 1 WEEK   • Destruction by neurolytic agt lumbar or sacral facet jt  1/21/2013     1st right LMBB   • Paravertebral facet inj jnt lumbar sacral 1  2/18/13     2nd RIGHT LMBB 1 WEEK   • Paravertebral facet inj jnt lumbar sacral 1  4/2/2013     Right LRFA   • Destruction by neurolytic agt lumbar or sacral facet jt  4/30/13     1st SLBB 1 week   • Cardiac catheterization         Family History:  Family History   Problem Relation Age of Onset   • Kidney disease Father    • Kidney disease Paternal Grandfather    • Cancer Brother        Social History:  Social History     Social History   • Marital status:      Spouse name: N/A   • Number of children: N/A   • Years of education: N/A     Occupational History   • Not on file.     Social History Main Topics   • Smoking status: Former Smoker     Packs/day: 1.00     Years: 21.00     Types: Cigars, Cigarettes     Quit date: 1/9/1995   • Smokeless tobacco: Not on file      Comment: Smokes cigars \"a couple times a week\" quit cigarettes.    • Alcohol use No      Comment: \"every now and then\"   • Drug use: No   • Sexual activity: Not on file     Other Topics Concern   • Not on file     Social History Narrative       Allergies:  ALLERGIES:   Allergen Reactions   • Contrast Media    • Aggrenox [Aspirin-Dipyridamole] SWELLING   • Heparin RASH     And swelling     • Plavix [Clopidogrel Bisulfate] SWELLING     Skin pealing   • Ticlid [Ticlopidine Hcl] SWELLING       Home Medications:  Patient to take supply of the following home medications:    Prescriptions Prior to Admission   Medication Sig Dispense Refill   • tadalafil (CIALIS) 20 MG tablet Take 1 tablet by mouth twice a week. (Patient taking differently: Take 20 mg by mouth as needed. Indications: Erectile Dysfunction ) 10 tablet 3   • aspirin 81 MG tablet Take 1 tablet by mouth daily.     • carvedilol (COREG) 6.25 MG tablet Take 6.25 mg by mouth 2 times daily (with meals).     •  lisinopril-hydrochlorothiazide (PRINZIDE,ZESTORETIC) 20-12.5 MG per tablet Take 1 tablet by mouth every 12 hours.     • pravastatin (PRAVACHOL) 20 MG tablet Take 20 mg by mouth nightly.      • potassium chloride (K-DUR) 10 MEQ tablet Take 10 mEq by mouth 2 times daily.     • amLODIPine (NORVASC) 10 MG tablet Take 10 mg by mouth daily.         Please update the MAR labels to indicate \"Patient to take home supply.\"      ROS:  Twelve points were reviewed  GENERAL: Denies any chills, fever.  RESPIRATORY: Complains of shortness of breath, denies hemoptysis.     CARDIOVASCULAR: Denies chest pain, palpitations.     GASTROINTESTINAL: Denies nausea, vomiting.     GENITOURINARY: Denies dysuria, hematuria.     MUSCULOSKELETAL: Trace edema.     ENDOCRINE: Denies heat or cold intolerance.     HEENT: Negative.     SKIN: Denies skin rash or itching.     IMMUNOLOGICAL: Denies allergies  PSYCHIATRIC: Denies any delusions, hallucinations.     CENTRAL NERVOUS SYSTEM: Denies any new stroke-like symptoms, vertigo or diplopia.      Physical Exam:  GENERAL: Alert and oriented x3, interacts very well, follows commands. Memory, mood and affect are at baseline.    Vitals:    01/11/17 0439   BP: 126/66   Pulse: 84   Resp: 16   Temp: 98.1 °F (36.7 °C)       SKIN: Warm and dry.  HEENT: Both pupils are equal and responding to light. Nose: Both nostrils patent.  LOWER EXTREMITY: Trace edema. Pedal pulses are very palpable.  Left fifth toe ischemia, left foot ischemia  NECK: Supple. Lymph nodes are not felt in the neck region.       LUNGS:  Lungs show coarse breath sounds bilaterally with moist crackles  CARDIOVASCULAR: S1, S2 with displaced PMI.  ABDOMEN: Soft, bowel sounds positive, nontender.  RECTAL AND PELVIC: Not performed.  CENTRAL NERVOUS SYSTEM: Cranial nerves are grossly intact 2-12. Motor strength both upper and lower extremities, positive 5/5. Reflexes, bulk, tone baseline. Sensory: Fine touch is intact. Gait is also at baseline.        Labs:    Recent Labs  Lab 01/11/17  0615   WBC 9.1   RBC 4.22*   HGB 12.5*   HCT 37.0*          Recent Labs  Lab 01/11/17  0615   SODIUM 139   POTASSIUM 4.3   CHLORIDE 104   CO2 27   BUN 20   CREATININE 1.53*   GLUCOSE 84   CALCIUM 8.3*           Impression:   I99.8 Left foot ischemia  I96 Dry Gangrene, left fifth toe  I73.9 Peripheral vascular disease, unspecified  N17.9 Acute kidney failure, unspecified  I10 Essential Hypertension  I25.10 Atherosclerotic heart disease of native coronary artery without angina pectoris  E66.9 Obesity, unspecified        Plan:  Admit as inpatient  Plan for angiogram in am   For angioplasty and stent .  Discussed with cardiology   Cardiology consult  Nephrology consult  Labs in AM  Amlodipine  Asa, statin, coreg  lovenox    Khai Dueñas MD  ____________________________________________________________  Nely Adames acting as a scribe for Dr. Dueñas    I have reviewed the information recorded by the scribe for accuracy and agree with its contents.    Dr. Spenser MD  Physician #:[id number]     right LE ROM limited 2/2 posterior hip precautions and s/p hemiarthroplasty/Left LE Active ROM was WFL (within functional limits)

## 2023-03-11 NOTE — PHYSICAL THERAPY INITIAL EVALUATION ADULT - GENERAL OBSERVATIONS, REHAB EVAL
Pt encountered semisupine in bed, NAD, AXOX4, +heplock, +dressing R hip c/d/i, +abduction pillow intact, pt son present, OT Jeffery, no c/o pain s/p right hemiarthroplasty., Son Chaz at bedside

## 2023-03-11 NOTE — OCCUPATIONAL THERAPY INITIAL EVALUATION ADULT - ADL RETRAINING, OT EVAL
Patient will be able to perform functional tasks with independence, using least restrictive device, within 4-6 weeks.

## 2023-03-11 NOTE — OCCUPATIONAL THERAPY INITIAL EVALUATION ADULT - TRANSFER TRAINING, PT EVAL
Patient will be able to perform functional transfers, using least restrictive device, c CGA within 2 weeks.

## 2023-03-11 NOTE — PHYSICAL THERAPY INITIAL EVALUATION ADULT - ACTIVE RANGE OF MOTION EXAMINATION, REHAB EVAL
R hip posterior dislocation: NO flexion beyond 90 degrees, no IR, no ADD pass midline/Left LE Active ROM was WFL (within functional limits)/deficits as listed below

## 2023-03-11 NOTE — OCCUPATIONAL THERAPY INITIAL EVALUATION ADULT - BED MOBILITY TRAINING, PT EVAL
Patient will be able to perform bed mobility tasks c CGA, using least restrictive device, within 2 weeks.

## 2023-03-11 NOTE — PROGRESS NOTE ADULT - SUBJECTIVE AND OBJECTIVE BOX
Patient seen and examined at bedside. Pain is controlled. Pt feeling well. No nausea or vomiting.    Vital Signs Last 24 Hrs  T(C): 36.4 (11 Mar 2023 05:14), Max: 36.4 (10 Mar 2023 10:36)  T(F): 97.5 (11 Mar 2023 05:14), Max: 97.5 (10 Mar 2023 10:36)  HR: 95 (11 Mar 2023 05:14) (68 - 110)  BP: 170/74 (11 Mar 2023 05:14) (122/56 - 182/85)  BP(mean): --  RR: 18 (11 Mar 2023 05:14) (12 - 18)  SpO2: 98% (11 Mar 2023 05:14) (92% - 99%)    Parameters below as of 11 Mar 2023 05:14  Patient On (Oxygen Delivery Method): room air        Exam:  Gen: NAD, resting comfortably  RLE  Dressing c/d/i  +EHL/FHL/TA/GS  SILT dp/sp/saph/sural  +DP  Calf NTTP b/l  Compartments soft and compressible    A/P:  89yFemale Stable POD 0  s/p R yfn    -FU labs  -WBAT  -posterior hip precautions, abduction pillow when laying  -Pain control  -PT/OT  -Ppx ABX x24hrs  -DVT PE ppx- hold until POD1 then lovenox  -Incentive spirometry  -dispo per pt Patient seen and examined at bedside. Pain is controlled. Pt feeling well. No nausea or vomiting.    Vital Signs Last 24 Hrs  T(C): 36.4 (11 Mar 2023 05:14), Max: 36.4 (10 Mar 2023 10:36)  T(F): 97.5 (11 Mar 2023 05:14), Max: 97.5 (10 Mar 2023 10:36)  HR: 95 (11 Mar 2023 05:14) (68 - 110)  BP: 170/74 (11 Mar 2023 05:14) (122/56 - 182/85)  BP(mean): --  RR: 18 (11 Mar 2023 05:14) (12 - 18)  SpO2: 98% (11 Mar 2023 05:14) (92% - 99%)    Parameters below as of 11 Mar 2023 05:14  Patient On (Oxygen Delivery Method): room air        Exam:  Gen: NAD, resting comfortably  RLE  Dressing c/d/i  +EHL/FHL/TA/GS  SILT dp/sp/saph, subjective sural nerve diminished sensation  +DP  Calf NTTP b/l  Compartments soft and compressible    A/P:  89yFemale Stable POD 0  s/p R yfn    -FU labs  -WBAT  -posterior hip precautions, abduction pillow when laying  -Pain control  -PT/OT  -Ppx ABX x24hrs  -DVT PE ppx- hold until POD1 then lovenox  -Incentive spirometry  -dispo per pt Patient seen and examined at bedside. Pain is controlled. Pt feeling well. No nausea or vomiting.    Vital Signs Last 24 Hrs  T(C): 36.4 (11 Mar 2023 05:14), Max: 36.4 (10 Mar 2023 10:36)  T(F): 97.5 (11 Mar 2023 05:14), Max: 97.5 (10 Mar 2023 10:36)  HR: 95 (11 Mar 2023 05:14) (68 - 110)  BP: 170/74 (11 Mar 2023 05:14) (122/56 - 182/85)  BP(mean): --  RR: 18 (11 Mar 2023 05:14) (12 - 18)  SpO2: 98% (11 Mar 2023 05:14) (92% - 99%)    Parameters below as of 11 Mar 2023 05:14  Patient On (Oxygen Delivery Method): room air        Exam:  Gen: NAD, resting comfortably  RLE  Dressing c/d/i  +EHL/FHL/TA/GS  SILT dp/sp/saph, subjective sural nerve diminished sensation  +DP  Calf NTTP b/l  Compartments soft and compressible    A/P:  89yFemale Stable POD1 s/p R yfn    -FU labs  -WBAT  -posterior hip precautions, abduction pillow when laying  -Pain control  -PT/OT  -Ppx ABX x24hrs  -DVT PE ppx- hold until POD1 then lovenox  -Incentive spirometry  -dispo per pt

## 2023-03-11 NOTE — OCCUPATIONAL THERAPY INITIAL EVALUATION ADULT - PERTINENT HX OF CURRENT PROBLEM, REHAB EVAL
Pt is 89 yo F AXOX4, hx of multiple falls, admitted to [facility] ED on 3/09/23 fell on 28th of February c/o right hip bruising and inability to bear weight, sent by DR. David Olivo for right hip surgery. Now POD0 Right hip hemiarthroplasty.

## 2023-03-12 LAB
ANION GAP SERPL CALC-SCNC: 5 MMOL/L — SIGNIFICANT CHANGE UP (ref 5–17)
APPEARANCE UR: CLEAR — SIGNIFICANT CHANGE UP
BACTERIA # UR AUTO: ABNORMAL
BILIRUB UR-MCNC: NEGATIVE — SIGNIFICANT CHANGE UP
BUN SERPL-MCNC: 15 MG/DL — SIGNIFICANT CHANGE UP (ref 7–23)
CALCIUM SERPL-MCNC: 9.2 MG/DL — SIGNIFICANT CHANGE UP (ref 8.5–10.1)
CHLORIDE SERPL-SCNC: 99 MMOL/L — SIGNIFICANT CHANGE UP (ref 96–108)
CO2 SERPL-SCNC: 22 MMOL/L — SIGNIFICANT CHANGE UP (ref 22–31)
COLOR SPEC: YELLOW — SIGNIFICANT CHANGE UP
CREAT SERPL-MCNC: 0.84 MG/DL — SIGNIFICANT CHANGE UP (ref 0.5–1.3)
DIFF PNL FLD: NEGATIVE — SIGNIFICANT CHANGE UP
EGFR: 64 ML/MIN/1.73M2 — SIGNIFICANT CHANGE UP
EPI CELLS # UR: SIGNIFICANT CHANGE UP
GLUCOSE SERPL-MCNC: 103 MG/DL — HIGH (ref 70–99)
GLUCOSE UR QL: NEGATIVE MG/DL — SIGNIFICANT CHANGE UP
HCT VFR BLD CALC: 32.2 % — LOW (ref 34.5–45)
HGB BLD-MCNC: 10.8 G/DL — LOW (ref 11.5–15.5)
KETONES UR-MCNC: ABNORMAL
LEUKOCYTE ESTERASE UR-ACNC: NEGATIVE — SIGNIFICANT CHANGE UP
MCHC RBC-ENTMCNC: 30.5 PG — SIGNIFICANT CHANGE UP (ref 27–34)
MCHC RBC-ENTMCNC: 33.5 G/DL — SIGNIFICANT CHANGE UP (ref 32–36)
MCV RBC AUTO: 91 FL — SIGNIFICANT CHANGE UP (ref 80–100)
NITRITE UR-MCNC: NEGATIVE — SIGNIFICANT CHANGE UP
NRBC # BLD: 0 /100 WBCS — SIGNIFICANT CHANGE UP (ref 0–0)
PH UR: 6 — SIGNIFICANT CHANGE UP (ref 5–8)
PLATELET # BLD AUTO: 417 K/UL — HIGH (ref 150–400)
POTASSIUM SERPL-MCNC: 4.1 MMOL/L — SIGNIFICANT CHANGE UP (ref 3.5–5.3)
POTASSIUM SERPL-SCNC: 4.1 MMOL/L — SIGNIFICANT CHANGE UP (ref 3.5–5.3)
PROT UR-MCNC: 15 MG/DL
RBC # BLD: 3.54 M/UL — LOW (ref 3.8–5.2)
RBC # FLD: 14.4 % — SIGNIFICANT CHANGE UP (ref 10.3–14.5)
RBC CASTS # UR COMP ASSIST: NEGATIVE /HPF — SIGNIFICANT CHANGE UP (ref 0–4)
SODIUM SERPL-SCNC: 126 MMOL/L — LOW (ref 135–145)
SP GR SPEC: 1.01 — SIGNIFICANT CHANGE UP (ref 1.01–1.02)
UROBILINOGEN FLD QL: NEGATIVE MG/DL — SIGNIFICANT CHANGE UP
WBC # BLD: 13.48 K/UL — HIGH (ref 3.8–10.5)
WBC # FLD AUTO: 13.48 K/UL — HIGH (ref 3.8–10.5)
WBC UR QL: SIGNIFICANT CHANGE UP

## 2023-03-12 PROCEDURE — 99232 SBSQ HOSP IP/OBS MODERATE 35: CPT

## 2023-03-12 RX ORDER — ONDANSETRON 8 MG/1
1 TABLET, FILM COATED ORAL
Qty: 0 | Refills: 0 | DISCHARGE
Start: 2023-03-12

## 2023-03-12 RX ORDER — ENOXAPARIN SODIUM 100 MG/ML
40 INJECTION SUBCUTANEOUS
Qty: 0 | Refills: 0 | DISCHARGE
Start: 2023-03-12

## 2023-03-12 RX ORDER — OXYCODONE HYDROCHLORIDE 5 MG/1
1 TABLET ORAL
Qty: 0 | Refills: 0 | DISCHARGE
Start: 2023-03-12

## 2023-03-12 RX ORDER — PANTOPRAZOLE SODIUM 20 MG/1
40 TABLET, DELAYED RELEASE ORAL
Refills: 0 | Status: DISCONTINUED | OUTPATIENT
Start: 2023-03-12 | End: 2023-03-14

## 2023-03-12 RX ORDER — LANOLIN ALCOHOL/MO/W.PET/CERES
1 CREAM (GRAM) TOPICAL
Qty: 0 | Refills: 0 | DISCHARGE
Start: 2023-03-12

## 2023-03-12 RX ORDER — ONDANSETRON 8 MG/1
4 TABLET, FILM COATED ORAL ONCE
Refills: 0 | Status: DISCONTINUED | OUTPATIENT
Start: 2023-03-12 | End: 2023-03-14

## 2023-03-12 RX ADMIN — AMLODIPINE BESYLATE 10 MILLIGRAM(S): 2.5 TABLET ORAL at 05:45

## 2023-03-12 RX ADMIN — Medication 3 MILLIGRAM(S): at 21:43

## 2023-03-12 RX ADMIN — Medication 975 MILLIGRAM(S): at 05:45

## 2023-03-12 RX ADMIN — ONDANSETRON 4 MILLIGRAM(S): 8 TABLET, FILM COATED ORAL at 18:28

## 2023-03-12 RX ADMIN — Medication 975 MILLIGRAM(S): at 13:16

## 2023-03-12 RX ADMIN — ONDANSETRON 4 MILLIGRAM(S): 8 TABLET, FILM COATED ORAL at 12:36

## 2023-03-12 RX ADMIN — Medication 975 MILLIGRAM(S): at 21:44

## 2023-03-12 RX ADMIN — PANTOPRAZOLE SODIUM 40 MILLIGRAM(S): 20 TABLET, DELAYED RELEASE ORAL at 12:35

## 2023-03-12 RX ADMIN — Medication 25 MILLIGRAM(S): at 12:40

## 2023-03-12 RX ADMIN — ONDANSETRON 4 MILLIGRAM(S): 8 TABLET, FILM COATED ORAL at 05:45

## 2023-03-12 RX ADMIN — Medication 25 MILLIGRAM(S): at 17:55

## 2023-03-12 RX ADMIN — OXYCODONE HYDROCHLORIDE 5 MILLIGRAM(S): 5 TABLET ORAL at 00:23

## 2023-03-12 RX ADMIN — ENOXAPARIN SODIUM 40 MILLIGRAM(S): 100 INJECTION SUBCUTANEOUS at 05:45

## 2023-03-12 RX ADMIN — OXYCODONE HYDROCHLORIDE 5 MILLIGRAM(S): 5 TABLET ORAL at 01:23

## 2023-03-12 RX ADMIN — POLYETHYLENE GLYCOL 3350 17 GRAM(S): 17 POWDER, FOR SOLUTION ORAL at 21:45

## 2023-03-12 RX ADMIN — Medication 975 MILLIGRAM(S): at 05:46

## 2023-03-12 RX ADMIN — Medication 975 MILLIGRAM(S): at 13:45

## 2023-03-12 RX ADMIN — BUDESONIDE AND FORMOTEROL FUMARATE DIHYDRATE 2 PUFF(S): 160; 4.5 AEROSOL RESPIRATORY (INHALATION) at 17:55

## 2023-03-12 RX ADMIN — BUDESONIDE AND FORMOTEROL FUMARATE DIHYDRATE 2 PUFF(S): 160; 4.5 AEROSOL RESPIRATORY (INHALATION) at 05:45

## 2023-03-12 RX ADMIN — SENNA PLUS 2 TABLET(S): 8.6 TABLET ORAL at 21:44

## 2023-03-12 NOTE — PROGRESS NOTE ADULT - SUBJECTIVE AND OBJECTIVE BOX
CHIEF COMPLAINT: Follow up of HTN  left hip pain under control POD#2  no chest pain   no sob reported  no fever  no n/v/d/abd pain or leg edema  Tolerating orals    PHYSICAL EXAM:    GENERAL: Moderately built, no acute distress   CHEST/LUNG:  No wheezing, no crackles   HEART: Regular rate and rhythm; No murmurs  ABDOMEN: Soft, Nontender, Nondistended; Bowel sounds present  EXTREMITIES:  No clubbing, cyanosis, or edema. S/p right hip hemiarthroplasty  Psychiatry: AAO x 2-3, mood is appropriate       OBJECTIVE DATA:       Vital Signs Last 24 Hrs  T(C): 36.3 (12 Mar 2023 05:23), Max: 36.6 (11 Mar 2023 12:40)  T(F): 97.3 (12 Mar 2023 05:23), Max: 97.8 (11 Mar 2023 12:40)  HR: 60 (12 Mar 2023 06:32) (56 - 88)  BP: 117/68 (12 Mar 2023 05:23) (117/68 - 168/69)  BP(mean): --  RR: 18 (12 Mar 2023 06:32) (17 - 18)  SpO2: 96% (12 Mar 2023 06:32) (94% - 100%)    Parameters below as of 12 Mar 2023 05:23  Patient On (Oxygen Delivery Method): room air               Daily     Daily   LABS:                        10.8   13.48 )-----------( 417      ( 12 Mar 2023 07:10 )             32.2             03-12    126<L>  |  99  |  15  ----------------------------<  103<H>  4.1   |  22  |  0.84    Ca    9.2      12 Mar 2023 07:10                       MEDICATIONS  (STANDING):  acetaminophen     Tablet .. 975 milliGRAM(s) Oral every 8 hours  acetaminophen   IVPB .. 1000 milliGRAM(s) IV Intermittent once  amLODIPine   Tablet 10 milliGRAM(s) Oral daily  budesonide 160 MICROgram(s)/formoterol 4.5 MICROgram(s) Inhaler 2 Puff(s) Inhalation two times a day  enoxaparin Injectable 40 milliGRAM(s) SubCutaneous every 24 hours  melatonin 3 milliGRAM(s) Oral at bedtime  metoprolol tartrate 25 milliGRAM(s) Oral two times a day  ondansetron    Tablet 4 milliGRAM(s) Oral four times a day  polyethylene glycol 3350 17 Gram(s) Oral at bedtime  senna 2 Tablet(s) Oral at bedtime    MEDICATIONS  (PRN):  magnesium hydroxide Suspension 30 milliLiter(s) Oral daily PRN Constipation  ondansetron Injectable 4 milliGRAM(s) IV Push every 6 hours PRN Nausea and/or Vomiting  oxyCODONE    IR 2.5 milliGRAM(s) Oral every 3 hours PRN Moderate Pain (4 - 6)  oxyCODONE    IR 5 milliGRAM(s) Oral every 3 hours PRN Severe Pain (7 - 10)         CHIEF COMPLAINT: Follow up of HTN  left hip pain under control POD#2  no chest pain   no sob reported  no fever  no n/v/d/abd pain or leg edema  Tolerating orals  + heart burns    PHYSICAL EXAM:    GENERAL: Moderately built, no acute distress   CHEST/LUNG:  No wheezing, no crackles   HEART: Regular rate and rhythm; No murmurs  ABDOMEN: Soft, Nontender, Nondistended; Bowel sounds present  EXTREMITIES:  No clubbing, cyanosis, or edema. S/p right hip hemiarthroplasty  Psychiatry: AAO x 2-3, mood is appropriate       OBJECTIVE DATA:       Vital Signs Last 24 Hrs  T(C): 36.3 (12 Mar 2023 05:23), Max: 36.6 (11 Mar 2023 12:40)  T(F): 97.3 (12 Mar 2023 05:23), Max: 97.8 (11 Mar 2023 12:40)  HR: 60 (12 Mar 2023 06:32) (56 - 88)  BP: 117/68 (12 Mar 2023 05:23) (117/68 - 168/69)  BP(mean): --  RR: 18 (12 Mar 2023 06:32) (17 - 18)  SpO2: 96% (12 Mar 2023 06:32) (94% - 100%)    Parameters below as of 12 Mar 2023 05:23  Patient On (Oxygen Delivery Method): room air               Daily     Daily   LABS:                        10.8   13.48 )-----------( 417      ( 12 Mar 2023 07:10 )             32.2             03-12    126<L>  |  99  |  15  ----------------------------<  103<H>  4.1   |  22  |  0.84    Ca    9.2      12 Mar 2023 07:10                       MEDICATIONS  (STANDING):  acetaminophen     Tablet .. 975 milliGRAM(s) Oral every 8 hours  acetaminophen   IVPB .. 1000 milliGRAM(s) IV Intermittent once  amLODIPine   Tablet 10 milliGRAM(s) Oral daily  budesonide 160 MICROgram(s)/formoterol 4.5 MICROgram(s) Inhaler 2 Puff(s) Inhalation two times a day  enoxaparin Injectable 40 milliGRAM(s) SubCutaneous every 24 hours  melatonin 3 milliGRAM(s) Oral at bedtime  metoprolol tartrate 25 milliGRAM(s) Oral two times a day  ondansetron    Tablet 4 milliGRAM(s) Oral four times a day  polyethylene glycol 3350 17 Gram(s) Oral at bedtime  senna 2 Tablet(s) Oral at bedtime    MEDICATIONS  (PRN):  magnesium hydroxide Suspension 30 milliLiter(s) Oral daily PRN Constipation  ondansetron Injectable 4 milliGRAM(s) IV Push every 6 hours PRN Nausea and/or Vomiting  oxyCODONE    IR 2.5 milliGRAM(s) Oral every 3 hours PRN Moderate Pain (4 - 6)  oxyCODONE    IR 5 milliGRAM(s) Oral every 3 hours PRN Severe Pain (7 - 10)

## 2023-03-12 NOTE — PROGRESS NOTE ADULT - ASSESSMENT
89 y/o female with pmh of HTN presents with right hip fx s/p mechanical fall in need of hemiarthroplasty.  History taken from patient and son and she denies any history of chf/CAD/angina/orthopnea/dyspnea on exertion. Can walk a distance of multiple blocks without stopping. vitals and PE benign. Denies any current cp, sob, cough or increased sputum. EKG unremarkable. Has mild hyponatremia but baseline labs are uknown. Will give a small bolus of normal saline and reeval BMP. Pt low to intermediate risk for planned procedure.     HTN. controlled. cont home dose of hydralazine.  - c/w amlodipine and BB.      hyponatremia   -asymptomatic. No need for IVF now.     Right hip fracture. Traumatic from fall.  -cont current pain control.  Lovenox for DVT ppx.   JOHNNY  per PT recs.     Full code   Medically no contraindications to discharge.  89 y/o female with pmh of HTN presents with right hip fx s/p mechanical fall in need of hemiarthroplasty.  History taken from patient and son and she denies any history of chf/CAD/angina/orthopnea/dyspnea on exertion. Can walk a distance of multiple blocks without stopping. vitals and PE benign. Denies any current cp, sob, cough or increased sputum. EKG unremarkable. Has mild hyponatremia but baseline labs are uknown. Will give a small bolus of normal saline and reeval BMP. Pt low to intermediate risk for planned procedure.     HTN. controlled. cont home dose of hydralazine.  - c/w amlodipine and BB.      hyponatremia   -asymptomatic. No need for IVF now.     Right hip fracture. Traumatic from fall.  -cont current pain control.  Lovenox for DVT ppx.   JOHNNY  per PT recs.     Give PPI for  heartburns and evaluate for improvement.     Full code   Medically no contraindications to discharge.

## 2023-03-13 LAB
ANION GAP SERPL CALC-SCNC: 4 MMOL/L — LOW (ref 5–17)
BUN SERPL-MCNC: 12 MG/DL — SIGNIFICANT CHANGE UP (ref 7–23)
CALCIUM SERPL-MCNC: 8.8 MG/DL — SIGNIFICANT CHANGE UP (ref 8.5–10.1)
CHLORIDE SERPL-SCNC: 100 MMOL/L — SIGNIFICANT CHANGE UP (ref 96–108)
CO2 SERPL-SCNC: 26 MMOL/L — SIGNIFICANT CHANGE UP (ref 22–31)
CREAT SERPL-MCNC: 0.72 MG/DL — SIGNIFICANT CHANGE UP (ref 0.5–1.3)
EGFR: 77 ML/MIN/1.73M2 — SIGNIFICANT CHANGE UP
GLUCOSE SERPL-MCNC: 95 MG/DL — SIGNIFICANT CHANGE UP (ref 70–99)
HCT VFR BLD CALC: 33.4 % — LOW (ref 34.5–45)
HGB BLD-MCNC: 11.3 G/DL — LOW (ref 11.5–15.5)
MCHC RBC-ENTMCNC: 30.5 PG — SIGNIFICANT CHANGE UP (ref 27–34)
MCHC RBC-ENTMCNC: 33.8 G/DL — SIGNIFICANT CHANGE UP (ref 32–36)
MCV RBC AUTO: 90 FL — SIGNIFICANT CHANGE UP (ref 80–100)
NRBC # BLD: 0 /100 WBCS — SIGNIFICANT CHANGE UP (ref 0–0)
PLATELET # BLD AUTO: 453 K/UL — HIGH (ref 150–400)
POTASSIUM SERPL-MCNC: 3.6 MMOL/L — SIGNIFICANT CHANGE UP (ref 3.5–5.3)
POTASSIUM SERPL-SCNC: 3.6 MMOL/L — SIGNIFICANT CHANGE UP (ref 3.5–5.3)
RBC # BLD: 3.71 M/UL — LOW (ref 3.8–5.2)
RBC # FLD: 14.6 % — HIGH (ref 10.3–14.5)
SODIUM SERPL-SCNC: 130 MMOL/L — LOW (ref 135–145)
WBC # BLD: 11.58 K/UL — HIGH (ref 3.8–10.5)
WBC # FLD AUTO: 11.58 K/UL — HIGH (ref 3.8–10.5)

## 2023-03-13 PROCEDURE — 99232 SBSQ HOSP IP/OBS MODERATE 35: CPT

## 2023-03-13 RX ORDER — ROPINIROLE 8 MG/1
0.25 TABLET, FILM COATED, EXTENDED RELEASE ORAL AT BEDTIME
Refills: 0 | Status: DISCONTINUED | OUTPATIENT
Start: 2023-03-13 | End: 2023-03-14

## 2023-03-13 RX ADMIN — Medication 975 MILLIGRAM(S): at 05:11

## 2023-03-13 RX ADMIN — POLYETHYLENE GLYCOL 3350 17 GRAM(S): 17 POWDER, FOR SOLUTION ORAL at 21:28

## 2023-03-13 RX ADMIN — ONDANSETRON 4 MILLIGRAM(S): 8 TABLET, FILM COATED ORAL at 19:08

## 2023-03-13 RX ADMIN — BUDESONIDE AND FORMOTEROL FUMARATE DIHYDRATE 2 PUFF(S): 160; 4.5 AEROSOL RESPIRATORY (INHALATION) at 19:07

## 2023-03-13 RX ADMIN — Medication 975 MILLIGRAM(S): at 22:00

## 2023-03-13 RX ADMIN — Medication 25 MILLIGRAM(S): at 19:07

## 2023-03-13 RX ADMIN — Medication 3 MILLIGRAM(S): at 21:27

## 2023-03-13 RX ADMIN — PANTOPRAZOLE SODIUM 40 MILLIGRAM(S): 20 TABLET, DELAYED RELEASE ORAL at 10:58

## 2023-03-13 RX ADMIN — AMLODIPINE BESYLATE 10 MILLIGRAM(S): 2.5 TABLET ORAL at 05:13

## 2023-03-13 RX ADMIN — Medication 975 MILLIGRAM(S): at 01:05

## 2023-03-13 RX ADMIN — Medication 975 MILLIGRAM(S): at 14:02

## 2023-03-13 RX ADMIN — ONDANSETRON 4 MILLIGRAM(S): 8 TABLET, FILM COATED ORAL at 05:12

## 2023-03-13 RX ADMIN — SENNA PLUS 2 TABLET(S): 8.6 TABLET ORAL at 21:27

## 2023-03-13 RX ADMIN — ENOXAPARIN SODIUM 40 MILLIGRAM(S): 100 INJECTION SUBCUTANEOUS at 05:12

## 2023-03-13 RX ADMIN — ONDANSETRON 4 MILLIGRAM(S): 8 TABLET, FILM COATED ORAL at 14:02

## 2023-03-13 RX ADMIN — Medication 25 MILLIGRAM(S): at 05:12

## 2023-03-13 RX ADMIN — BUDESONIDE AND FORMOTEROL FUMARATE DIHYDRATE 2 PUFF(S): 160; 4.5 AEROSOL RESPIRATORY (INHALATION) at 05:13

## 2023-03-13 RX ADMIN — Medication 975 MILLIGRAM(S): at 21:27

## 2023-03-13 RX ADMIN — ROPINIROLE 0.25 MILLIGRAM(S): 8 TABLET, FILM COATED, EXTENDED RELEASE ORAL at 21:26

## 2023-03-13 NOTE — PROGRESS NOTE ADULT - SUBJECTIVE AND OBJECTIVE BOX
CHIEF COMPLAINT: Follow up of HTN  left hip pain under control POD#3  no chest pain   no sob reported  no fever  no n/v/d/abd pain or leg edema  Tolerating orals  + heart burns better.     PHYSICAL EXAM:    GENERAL: Moderately built, no acute distress   CHEST/LUNG:  No wheezing, no crackles   HEART: Regular rate and rhythm; No murmurs  ABDOMEN: Soft, Nontender, Nondistended; Bowel sounds present  EXTREMITIES:  No clubbing, cyanosis, or edema. S/p right hip hemiarthroplasty  Psychiatry: AAO x 2-3, mood is appropriate       OBJECTIVE DATA:     Vital Signs Last 24 Hrs  T(C): 36.9 (13 Mar 2023 11:19), Max: 36.9 (13 Mar 2023 11:19)  T(F): 98.4 (13 Mar 2023 11:19), Max: 98.4 (13 Mar 2023 11:19)  HR: 74 (13 Mar 2023 11:42) (68 - 88)  BP: 147/67 (13 Mar 2023 11:42) (112/62 - 147/67)  BP(mean): --  RR: 18 (13 Mar 2023 11:42) (16 - 18)  SpO2: 97% (13 Mar 2023 11:42) (95% - 97%)    Parameters below as of 13 Mar 2023 11:42  Patient On (Oxygen Delivery Method): room air               Daily     Daily   LABS:                        11.3   11.58 )-----------( 453      ( 13 Mar 2023 06:52 )             33.4             03-13    130<L>  |  100  |  12  ----------------------------<  95  3.6   |  26  |  0.72    Ca    8.8      13 Mar 2023 06:52      MEDICATIONS  (STANDING):  acetaminophen     Tablet .. 975 milliGRAM(s) Oral every 8 hours  acetaminophen   IVPB .. 1000 milliGRAM(s) IV Intermittent once  amLODIPine   Tablet 10 milliGRAM(s) Oral daily  budesonide 160 MICROgram(s)/formoterol 4.5 MICROgram(s) Inhaler 2 Puff(s) Inhalation two times a day  enoxaparin Injectable 40 milliGRAM(s) SubCutaneous every 24 hours  melatonin 3 milliGRAM(s) Oral at bedtime  metoprolol tartrate 25 milliGRAM(s) Oral two times a day  ondansetron    Tablet 4 milliGRAM(s) Oral four times a day  ondansetron Injectable 4 milliGRAM(s) IV Push once  pantoprazole    Tablet 40 milliGRAM(s) Oral before breakfast  polyethylene glycol 3350 17 Gram(s) Oral at bedtime  rOPINIRole 0.25 milliGRAM(s) Oral at bedtime  senna 2 Tablet(s) Oral at bedtime    MEDICATIONS  (PRN):  magnesium hydroxide Suspension 30 milliLiter(s) Oral daily PRN Constipation  ondansetron Injectable 4 milliGRAM(s) IV Push every 6 hours PRN Nausea and/or Vomiting  oxyCODONE    IR 2.5 milliGRAM(s) Oral every 3 hours PRN Moderate Pain (4 - 6)  oxyCODONE    IR 5 milliGRAM(s) Oral every 3 hours PRN Severe Pain (7 - 10)

## 2023-03-13 NOTE — PROGRESS NOTE ADULT - SUBJECTIVE AND OBJECTIVE BOX
Patient seen and examined at bedside. Patient reports pain well controlled on medications. No acute events overnight. Denies numbness/tingling rle    Vital Signs Last 24 Hrs  T(C): 36.7 (12 Mar 2023 23:35), Max: 36.7 (12 Mar 2023 16:27)  T(F): 98.1 (12 Mar 2023 23:35), Max: 98.1 (12 Mar 2023 23:35)  HR: 68 (12 Mar 2023 23:35) (56 - 88)  BP: 123/70 (12 Mar 2023 23:35) (112/62 - 126/73)  BP(mean): --  RR: 18 (12 Mar 2023 23:35) (17 - 18)  SpO2: 96% (12 Mar 2023 23:35) (95% - 100%)    Parameters below as of 12 Mar 2023 23:35  Patient On (Oxygen Delivery Method): room air    Exam:  Gen: NAD, resting comfortably  RLE  Dressing c/d/i  +EHL/FHL/TA/GS  SILT dp/sp/saph, subjective sural nerve diminished sensation resolved  +DP  Calf NTTP b/l  Compartments soft and compressible              A/P:  89yFemale Stable POD3  s/p R yfn    -FU labs  -WBAT  -posterior hip precautions, abduction pillow when laying  -Pain control  -PT/OT  -DVT PE ppx- lovenox  -Incentive spirometry  -dispo: JOHNNY

## 2023-03-13 NOTE — PROGRESS NOTE ADULT - ASSESSMENT
89 y/o female with pmh of HTN presents with right hip fx s/p mechanical fall in need of hemiarthroplasty.  History taken from patient and son and she denies any history of chf/CAD/angina/orthopnea/dyspnea on exertion. Can walk a distance of multiple blocks without stopping. vitals and PE benign. Denies any current cp, sob, cough or increased sputum. EKG unremarkable. Has mild hyponatremia but baseline labs are uknown. Will give a small bolus of normal saline and reeval BMP. Pt low to intermediate risk for planned procedure.     HTN. controlled. cont home dose of hydralazine.  - c/w amlodipine and BB.    hyponatremia   -asymptomatic.     GERD. cont PPI.     Right hip fracture. Traumatic from fall.  -cont current pain control.  Lovenox for DVT ppx.   JOHNNY  per PT recs.     Restless leg syndrome. start requip at bedtime.       Full code   Medically no contraindications to discharge.

## 2023-03-14 ENCOUNTER — TRANSCRIPTION ENCOUNTER (OUTPATIENT)
Age: 88
End: 2023-03-14

## 2023-03-14 VITALS
TEMPERATURE: 98 F | SYSTOLIC BLOOD PRESSURE: 117 MMHG | DIASTOLIC BLOOD PRESSURE: 65 MMHG | RESPIRATION RATE: 18 BRPM | HEART RATE: 89 BPM | OXYGEN SATURATION: 96 %

## 2023-03-14 LAB
ANION GAP SERPL CALC-SCNC: 5 MMOL/L — SIGNIFICANT CHANGE UP (ref 5–17)
BUN SERPL-MCNC: 13 MG/DL — SIGNIFICANT CHANGE UP (ref 7–23)
CALCIUM SERPL-MCNC: 9.1 MG/DL — SIGNIFICANT CHANGE UP (ref 8.5–10.1)
CHLORIDE SERPL-SCNC: 98 MMOL/L — SIGNIFICANT CHANGE UP (ref 96–108)
CO2 SERPL-SCNC: 25 MMOL/L — SIGNIFICANT CHANGE UP (ref 22–31)
CREAT SERPL-MCNC: 0.75 MG/DL — SIGNIFICANT CHANGE UP (ref 0.5–1.3)
EGFR: 73 ML/MIN/1.73M2 — SIGNIFICANT CHANGE UP
FLUAV AG NPH QL: SIGNIFICANT CHANGE UP
FLUBV AG NPH QL: SIGNIFICANT CHANGE UP
GLUCOSE SERPL-MCNC: 98 MG/DL — SIGNIFICANT CHANGE UP (ref 70–99)
HCT VFR BLD CALC: 37.1 % — SIGNIFICANT CHANGE UP (ref 34.5–45)
HGB BLD-MCNC: 12.1 G/DL — SIGNIFICANT CHANGE UP (ref 11.5–15.5)
MCHC RBC-ENTMCNC: 29.8 PG — SIGNIFICANT CHANGE UP (ref 27–34)
MCHC RBC-ENTMCNC: 32.6 G/DL — SIGNIFICANT CHANGE UP (ref 32–36)
MCV RBC AUTO: 91.4 FL — SIGNIFICANT CHANGE UP (ref 80–100)
NRBC # BLD: 0 /100 WBCS — SIGNIFICANT CHANGE UP (ref 0–0)
PLATELET # BLD AUTO: 498 K/UL — HIGH (ref 150–400)
POTASSIUM SERPL-MCNC: 3.9 MMOL/L — SIGNIFICANT CHANGE UP (ref 3.5–5.3)
POTASSIUM SERPL-SCNC: 3.9 MMOL/L — SIGNIFICANT CHANGE UP (ref 3.5–5.3)
RBC # BLD: 4.06 M/UL — SIGNIFICANT CHANGE UP (ref 3.8–5.2)
RBC # FLD: 14.6 % — HIGH (ref 10.3–14.5)
SARS-COV-2 RNA SPEC QL NAA+PROBE: SIGNIFICANT CHANGE UP
SODIUM SERPL-SCNC: 128 MMOL/L — LOW (ref 135–145)
WBC # BLD: 11.37 K/UL — HIGH (ref 3.8–10.5)
WBC # FLD AUTO: 11.37 K/UL — HIGH (ref 3.8–10.5)

## 2023-03-14 PROCEDURE — 99232 SBSQ HOSP IP/OBS MODERATE 35: CPT

## 2023-03-14 RX ADMIN — Medication 975 MILLIGRAM(S): at 05:17

## 2023-03-14 RX ADMIN — PANTOPRAZOLE SODIUM 40 MILLIGRAM(S): 20 TABLET, DELAYED RELEASE ORAL at 05:17

## 2023-03-14 RX ADMIN — ONDANSETRON 4 MILLIGRAM(S): 8 TABLET, FILM COATED ORAL at 00:30

## 2023-03-14 RX ADMIN — Medication 25 MILLIGRAM(S): at 05:17

## 2023-03-14 RX ADMIN — ENOXAPARIN SODIUM 40 MILLIGRAM(S): 100 INJECTION SUBCUTANEOUS at 05:16

## 2023-03-14 RX ADMIN — Medication 975 MILLIGRAM(S): at 15:51

## 2023-03-14 RX ADMIN — ONDANSETRON 4 MILLIGRAM(S): 8 TABLET, FILM COATED ORAL at 05:17

## 2023-03-14 RX ADMIN — ONDANSETRON 4 MILLIGRAM(S): 8 TABLET, FILM COATED ORAL at 15:52

## 2023-03-14 RX ADMIN — Medication 975 MILLIGRAM(S): at 06:04

## 2023-03-14 RX ADMIN — BUDESONIDE AND FORMOTEROL FUMARATE DIHYDRATE 2 PUFF(S): 160; 4.5 AEROSOL RESPIRATORY (INHALATION) at 05:19

## 2023-03-14 RX ADMIN — AMLODIPINE BESYLATE 10 MILLIGRAM(S): 2.5 TABLET ORAL at 05:17

## 2023-03-14 NOTE — PROGRESS NOTE ADULT - SUBJECTIVE AND OBJECTIVE BOX
Patient seen and examined at bedside. Pain well controlled with medication. Patient denies any numbness, tingling, weakness, or any other orthopaedic complaint. Denies N/V/CP/SOB.       VITAL SIGNS:  T(C): 36.6 (03-14-23 @ 05:28), Max: 36.9 (03-13-23 @ 11:19)  HR: 64 (03-14-23 @ 05:28) (64 - 78)  BP: 157/63 (03-14-23 @ 05:28) (120/79 - 157/63)  RR: 18 (03-14-23 @ 05:28) (16 - 18)  SpO2: 98% (03-14-23 @ 05:28) (95% - 98%)      LABS:                        12.1   11.37 )-----------( 498      ( 14 Mar 2023 06:52 )             37.1     03-13    130<L>  |  100  |  12  ----------------------------<  95  3.6   |  26  |  0.72    Ca    8.8      13 Mar 2023 06:52                  Exam:  Gen: NAD, resting comfortably  RLE  Dressing c/d/i  +EHL/FHL/TA/GS  SILT dp/sp/saph, subjective sural nerve diminished sensation resolved  +DP  Calf NTTP b/l  Compartments soft and compressible              A/P:  89yFemale Stable POD4  s/p R yfn    -FU labs  -WBAT  -posterior hip precautions, abduction pillow when laying  -Pain control  -PT/OT  -DVT PE ppx- lovenox  -Incentive spirometry  -dispo: JOHNNY  - Orthostable for DC

## 2023-03-14 NOTE — DISCHARGE NOTE NURSING/CASE MANAGEMENT/SOCIAL WORK - NSDCPEFALRISK_GEN_ALL_CORE
For information on Fall & Injury Prevention, visit: [email or web address] OR  [email or web address] OR  https://www.cdc.gov/steadi/patient.html

## 2023-03-14 NOTE — PROGRESS NOTE ADULT - ASSESSMENT
89 y/o female with pmh of HTN presents with right hip fx s/p mechanical fall in need of hemiarthroplasty.  History taken from patient and son and she denies any history of chf/CAD/angina/orthopnea/dyspnea on exertion. Can walk a distance of multiple blocks without stopping. vitals and PE benign. Denies any current cp, sob, cough or increased sputum. EKG unremarkable. Has mild hyponatremia but baseline labs are uknown. Will give a small bolus of normal saline and reeval BMP. Pt low to intermediate risk for planned procedure.     HTN. controlled. cont home dose of hydralazine.  - c/w amlodipine and BB.    hyponatremia   -asymptomatic.     GERD. cont PPI.     Right hip fracture. Traumatic from fall.  -cont current pain control.  Lovenox for DVT ppx.   JOHNNY  per PT recs.     Restless leg syndrome. start requip at bedtime.       Full code   Medically no contraindications to discharge.

## 2023-03-14 NOTE — PROGRESS NOTE ADULT - PROVIDER SPECIALTY LIST ADULT
Hospitalist
Orthopedics
Hospitalist
Hospitalist
Orthopedics
Hospitalist
Hospitalist

## 2023-03-14 NOTE — PROGRESS NOTE ADULT - SUBJECTIVE AND OBJECTIVE BOX
Patient is a 89y old  Female who presents with a chief complaint of R femoral neck fracture (10 Mar 2023 20:46)    INTERVAL HPI/OVERNIGHT EVENTS:    MEDICATIONS  (STANDING):  acetaminophen     Tablet .. 975 milliGRAM(s) Oral every 8 hours  acetaminophen   IVPB .. 1000 milliGRAM(s) IV Intermittent once  amLODIPine   Tablet 10 milliGRAM(s) Oral daily  budesonide 160 MICROgram(s)/formoterol 4.5 MICROgram(s) Inhaler 2 Puff(s) Inhalation two times a day  enoxaparin Injectable 40 milliGRAM(s) SubCutaneous every 24 hours  melatonin 3 milliGRAM(s) Oral at bedtime  metoprolol tartrate 25 milliGRAM(s) Oral two times a day  ondansetron    Tablet 4 milliGRAM(s) Oral four times a day  ondansetron Injectable 4 milliGRAM(s) IV Push once  pantoprazole    Tablet 40 milliGRAM(s) Oral before breakfast  polyethylene glycol 3350 17 Gram(s) Oral at bedtime  rOPINIRole 0.25 milliGRAM(s) Oral at bedtime  senna 2 Tablet(s) Oral at bedtime    MEDICATIONS  (PRN):  magnesium hydroxide Suspension 30 milliLiter(s) Oral daily PRN Constipation  ondansetron Injectable 4 milliGRAM(s) IV Push every 6 hours PRN Nausea and/or Vomiting  oxyCODONE    IR 2.5 milliGRAM(s) Oral every 3 hours PRN Moderate Pain (4 - 6)  oxyCODONE    IR 5 milliGRAM(s) Oral every 3 hours PRN Severe Pain (7 - 10)    Allergies    No Known Allergies    Intolerances      REVIEW OF SYSTEMS:  All other systems reviewed and are negative    Vital Signs Last 24 Hrs  T(C): 36.6 (14 Mar 2023 05:28), Max: 36.9 (13 Mar 2023 23:53)  T(F): 97.8 (14 Mar 2023 05:28), Max: 98.4 (13 Mar 2023 23:53)  HR: 74 (14 Mar 2023 11:30) (64 - 78)  BP: 127/60 (14 Mar 2023 11:30) (120/79 - 157/63)  BP(mean): --  RR: 17 (14 Mar 2023 11:30) (17 - 18)  SpO2: 96% (14 Mar 2023 11:30) (95% - 98%)    Parameters below as of 14 Mar 2023 11:30  Patient On (Oxygen Delivery Method): room air      Daily     Daily   I&O's Summary    13 Mar 2023 07:01  -  14 Mar 2023 07:00  --------------------------------------------------------  IN: 0 mL / OUT: 950 mL / NET: -950 mL      CAPILLARY BLOOD GLUCOSE        PHYSICAL EXAM:  GENERAL: NAD,    HEAD:  Atraumatic, Normocephalic  EYES: EOMI, PERRLA, conjunctiva and sclera clear  ENMT: No tonsillar erythema, exudates, or enlargement; Moist mucous membranes, Good dentition, No lesions  NECK: Supple, No JVD, Normal thyroid  NERVOUS SYSTEM:  Alert & Oriented X3, Good concentration; Motor Strength 5/5 B/L upper and lower extremities; DTRs 2+ intact and symmetric  CHEST/LUNG: Clear to percussion bilaterally; No rales, rhonchi, wheezing, or rubs  HEART: Regular rate and rhythm; No murmurs, rubs, or gallops  ABDOMEN: Soft, Nontender, Nondistended; Bowel sounds present  EXTREMITIES:  2+ Peripheral Pulses, No clubbing, cyanosis, or edema  LYMPH: No lymphadenopathy noted  SKIN: No rashes or lesions    Labs                          12.1   11.37 )-----------( 498      ( 14 Mar 2023 06:52 )             37.1     03-14    128<L>  |  98  |  13  ----------------------------<  98  3.9   |  25  |  0.75    Ca    9.1      14 Mar 2023 06:52                          DVT prophylaxis: > Lovenox 40mg SQ daily  > Heparin   > SCD's

## 2023-03-14 NOTE — DISCHARGE NOTE NURSING/CASE MANAGEMENT/SOCIAL WORK - PATIENT PORTAL LINK FT
You can access the FollowMyHealth Patient Portal offered by [facility] by registering at the following website: [email or web address]. By joining [employer]’s FollowMyHealth portal, you will also be able to view your health information using other applications (apps) compatible with our system.

## 2023-03-22 DIAGNOSIS — S72.001A FRACTURE OF UNSPECIFIED PART OF NECK OF RIGHT FEMUR, INITIAL ENCOUNTER FOR CLOSED FRACTURE: ICD-10-CM

## 2023-03-22 DIAGNOSIS — K21.9 GASTRO-ESOPHAGEAL REFLUX DISEASE WITHOUT ESOPHAGITIS: ICD-10-CM
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
## 2023-03-22 DIAGNOSIS — G25.81 RESTLESS LEGS SYNDROME: ICD-10-CM

## 2023-03-22 DIAGNOSIS — I10 ESSENTIAL (PRIMARY) HYPERTENSION: ICD-10-CM

## 2023-03-22 DIAGNOSIS — J44.9 CHRONIC OBSTRUCTIVE PULMONARY DISEASE, UNSPECIFIED: ICD-10-CM
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
## 2023-03-22 DIAGNOSIS — S72.011A UNSPECIFIED INTRACAPSULAR FRACTURE OF RIGHT FEMUR, INITIAL ENCOUNTER FOR CLOSED FRACTURE: ICD-10-CM

## 2023-03-22 DIAGNOSIS — E87.1 HYPO-OSMOLALITY AND HYPONATREMIA: ICD-10-CM

## 2023-04-26 ENCOUNTER — APPOINTMENT (OUTPATIENT)
Dept: ORTHOPEDIC SURGERY | Facility: CLINIC | Age: 88
End: 2023-04-26
Payer: MEDICARE

## 2023-04-26 PROCEDURE — 99024 POSTOP FOLLOW-UP VISIT: CPT

## 2023-04-26 PROCEDURE — 73503 X-RAY EXAM HIP UNI 4/> VIEWS: CPT | Mod: RT

## 2023-04-26 NOTE — HISTORY OF PRESENT ILLNESS
[Sudden] : sudden [10] : 10 [5] : 5 [Dull/Aching] : dull/aching [Constant] : constant [Retired] : Work status: retired [de-identified] : 03/07/2023 Ms. SOLA RINCON is a 89 years old  F who  presents today for evaluation of right hip. States was getting up from a chair and fell and landed on her knee and hip two weeks prior. Did not go to the ER. Had an XR showing a femoral neck fx. Ambulates with a cane prior to fall. \par PMH: COPD\par \par 4/26/23: 6 weeks s/p yfn for femoral neck fx. \par \par 04/26/2023: SOLA a 89 year y/o female is here today for right hip fx. pt states since last visit, pain decreased. pain with prolonged walking. she is ambulating with walker.  [] : no [FreeTextEntry1] : right hip

## 2023-04-26 NOTE — IMAGING
[de-identified] : Constitutional: well developed and well nourished, able to communicate\par Cardiovascular: Peripheral vascular exam is grossly normal\par Neurologic: Alert and oriented, no acute distress.\par Skin: normal skin with no ulcers, rashes, or lesions\par Pulmonary: No respiratory distress, breathing comfortably on room air\par Lymphatics: No obvious lymphadenopathy or lymphedema in areas examined\par \par POSTOP RIGHT HIP EXAM\par Edema: mild\par well healing surgical incision without surrounding erythema/drainage\par \par ROM\par 0-90 degrees of flexion\par No pain with IR/ER ROM\par \par Neurovascular exam\par Motor function 5/5 distal lower extremity\par Sensation to light touch: intact\par Distal pulses: 2+\par \par XR of the R hip today demonstrate BETSY in place w/o signs of interval changes from postoperative XR

## 2023-04-26 NOTE — ASSESSMENT
[FreeTextEntry1] : 89 y/o F with femoral neck fracture, 2 week prior\par \par -Recommended go to the ER for R hemiarthroplasty risks and benefits explained\par -Patient Non weightbearing until after surgery\par \par 4/26/23: 6 weeks follow up. start PT at that time.

## 2023-06-28 ENCOUNTER — APPOINTMENT (OUTPATIENT)
Dept: ORTHOPEDIC SURGERY | Facility: CLINIC | Age: 88
End: 2023-06-28
Payer: MEDICARE

## 2023-06-28 VITALS — HEIGHT: 60 IN | WEIGHT: 110 LBS | BODY MASS INDEX: 21.6 KG/M2

## 2023-06-28 DIAGNOSIS — S72.009A FRACTURE OF UNSPECIFIED PART OF NECK OF UNSPECIFIED FEMUR, INITIAL ENCOUNTER FOR CLOSED FRACTURE: ICD-10-CM

## 2023-06-28 DIAGNOSIS — Z96.641 PRESENCE OF RIGHT ARTIFICIAL HIP JOINT: ICD-10-CM

## 2023-06-28 PROCEDURE — 99213 OFFICE O/P EST LOW 20 MIN: CPT

## 2023-06-28 PROCEDURE — 99214 OFFICE O/P EST MOD 30 MIN: CPT

## 2023-06-28 PROCEDURE — 73503 X-RAY EXAM HIP UNI 4/> VIEWS: CPT | Mod: RT

## 2023-06-28 NOTE — ASSESSMENT
[FreeTextEntry1] : 89 y/o F with femoral neck fracture, 2 week prior\par \par -Recommended go to the ER for R hemiarthroplasty risks and benefits explained\par -Patient Non weightbearing until after surgery\par \par 4/26/23: 6 weeks follow up. start PT at that time.\par \par 6/28/23: 3 months follow up. Doing well. Continue PT. annual follow up

## 2023-06-28 NOTE — IMAGING
[de-identified] : Constitutional: well developed and well nourished, able to communicate\par Cardiovascular: Peripheral vascular exam is grossly normal\par Neurologic: Alert and oriented, no acute distress.\par Skin: normal skin with no ulcers, rashes, or lesions\par Pulmonary: No respiratory distress, breathing comfortably on room air\par Lymphatics: No obvious lymphadenopathy or lymphedema in areas examined\par \par POSTOP RIGHT HIP EXAM\par Edema: mild\par well healing surgical incision without surrounding erythema/drainage\par \par ROM\par 0-90 degrees of flexion\par No pain with IR/ER ROM\par \par Neurovascular exam\par Motor function 5/5 distal lower extremity\par Sensation to light touch: intact\par Distal pulses: 2+\par \par XR of the R hip today demonstrate BETSY in place w/o signs of interval changes from postoperative XR

## 2023-06-28 NOTE — HISTORY OF PRESENT ILLNESS
[Sudden] : sudden [10] : 10 [5] : 5 [Dull/Aching] : dull/aching [Constant] : constant [Retired] : Work status: retired [de-identified] : 03/07/2023 Ms. SOLA RINCON is a 89 years old  F who  presents today for evaluation of right hip. States was getting up from a chair and fell and landed on her knee and hip two weeks prior. Did not go to the ER. Had an XR showing a femoral neck fx. Ambulates with a cane prior to fall. \par PMH: COPD\par \par 4/26/23: 6 weeks s/p fyn for femoral neck fx. \par \par 04/26/2023: OSLA a 89 year y/o female is here today for right hip fx. pt states since last visit, pain decreased. pain with prolonged walking. she is ambulating with walker. \par \par 06/28/2023 follow up right hip. 3 months s/p R yfn. Doing well, still in PT. Ambulating with a walker.  [] : no [FreeTextEntry1] : right hip

## 2025-02-23 NOTE — ED ADULT NURSE NOTE - TEMPLATE
Orthopedic Communicate Risk of Fall with Harm to all staff/Reinforce activity limits and safety measures with patient and family/Tailored Fall Risk Interventions/Visual Cue: Yellow wristband and red socks/Bed in lowest position, wheels locked, appropriate side rails in place/Call bell, personal items and telephone in reach/Instruct patient to call for assistance before getting out of bed or chair/Non-slip footwear when patient is out of bed/[place] to call system/Physically safe environment - no spills, clutter or unnecessary equipment/Purposeful Proactive Rounding/Room/bathroom lighting operational, light cord in reach